# Patient Record
Sex: MALE | Race: WHITE | NOT HISPANIC OR LATINO | Employment: OTHER | URBAN - METROPOLITAN AREA
[De-identification: names, ages, dates, MRNs, and addresses within clinical notes are randomized per-mention and may not be internally consistent; named-entity substitution may affect disease eponyms.]

---

## 2018-04-23 ENCOUNTER — OFFICE VISIT (OUTPATIENT)
Dept: ORTHOPEDIC SURGERY | Facility: CLINIC | Age: 68
End: 2018-04-23

## 2018-04-23 VITALS
BODY MASS INDEX: 31.94 KG/M2 | DIASTOLIC BLOOD PRESSURE: 83 MMHG | SYSTOLIC BLOOD PRESSURE: 145 MMHG | HEIGHT: 71 IN | HEART RATE: 65 BPM | WEIGHT: 228.18 LBS

## 2018-04-23 DIAGNOSIS — M17.11 PRIMARY OSTEOARTHRITIS OF RIGHT KNEE: Primary | ICD-10-CM

## 2018-04-23 PROCEDURE — 99203 OFFICE O/P NEW LOW 30 MIN: CPT | Performed by: ORTHOPAEDIC SURGERY

## 2018-04-23 PROCEDURE — 20610 DRAIN/INJ JOINT/BURSA W/O US: CPT | Performed by: ORTHOPAEDIC SURGERY

## 2018-04-23 RX ORDER — LEVOTHYROXINE SODIUM 125 MCG
125 TABLET ORAL DAILY
COMMUNITY
Start: 2018-02-24

## 2018-04-23 RX ORDER — ROPIVACAINE HYDROCHLORIDE 5 MG/ML
4 INJECTION, SOLUTION EPIDURAL; INFILTRATION; PERINEURAL
Status: COMPLETED | OUTPATIENT
Start: 2018-04-23 | End: 2018-04-23

## 2018-04-23 RX ORDER — SIMVASTATIN 10 MG
10 TABLET ORAL NIGHTLY
COMMUNITY
Start: 2018-04-06

## 2018-04-23 RX ORDER — TRIAMCINOLONE ACETONIDE 40 MG/ML
40 INJECTION, SUSPENSION INTRA-ARTICULAR; INTRAMUSCULAR
Status: COMPLETED | OUTPATIENT
Start: 2018-04-23 | End: 2018-04-23

## 2018-04-23 RX ORDER — LISINOPRIL AND HYDROCHLOROTHIAZIDE 20; 12.5 MG/1; MG/1
1 TABLET ORAL DAILY
COMMUNITY
Start: 2018-04-06

## 2018-04-23 RX ORDER — MELOXICAM 15 MG/1
TABLET ORAL
COMMUNITY
Start: 2018-04-11 | End: 2019-07-10

## 2018-04-23 RX ADMIN — ROPIVACAINE HYDROCHLORIDE 4 ML: 5 INJECTION, SOLUTION EPIDURAL; INFILTRATION; PERINEURAL at 11:12

## 2018-04-23 RX ADMIN — TRIAMCINOLONE ACETONIDE 40 MG: 40 INJECTION, SUSPENSION INTRA-ARTICULAR; INTRAMUSCULAR at 11:12

## 2018-04-23 NOTE — PROGRESS NOTES
[facility] [place] [facility]opa[facility] Surgery Clinic Note    Subjective     Chief Complaint   Patient presents with   • Right Knee - Pain        HPI    Mir Stokes is a 67 y.o. male. He presents today for evaluation of right knee pain.  He has moderate to severe pain, ongoing for 6 months, following no injury.  Pain is sharp, worse with climbing stairs, and unchanged over that timeframe.    Of note, his left total knee arthroplasty is functioning well.      There is no problem list on file for this patient.    Past Medical History:   Diagnosis Date   • Hypertension       Past Surgical History:   Procedure Laterality Date   • CATARACT EXTRACTION  2017   • JOINT REPLACEMENT Left 2014    TKA       Family History   Problem Relation Age of Onset   • Hypertension Mother    • Heart attack Mother    • Hypertension Father    • Heart attack Father      Social History     Social History   • Marital status:      Spouse name: N/A   • Number of children: N/A   • Years of education: N/A     Occupational History   • Not on file.     Social History Main Topics   • Smoking status: Never Smoker   • Smokeless tobacco: Current User     Types: Chew   • Alcohol use Yes      Comment: occasional   • Drug use: No   • Sexual activity: Defer     Other Topics Concern   • Not on file     Social History Narrative   • No narrative on file      No current outpatient prescriptions on file prior to visit.     No current facility-administered medications on file prior to visit.       No Known Allergies     Review of Systems   Constitutional: Negative for activity change, appetite change, chills, diaphoresis, fatigue, fever and unexpected weight change.   HENT: Negative for congestion, dental problem, drooling, ear discharge, ear pain, facial swelling, hearing loss, mouth sores, nosebleeds, postnasal drip, rhinorrhea, sinus pressure, sneezing, sore throat, tinnitus, trouble swallowing and voice change.    Eyes: Positive for visual disturbance. Negative for  "photophobia, pain, discharge, redness and itching.   Respiratory: Negative for apnea, cough, choking, chest tightness, shortness of breath, wheezing and stridor.    Cardiovascular: Negative for chest pain, palpitations and leg swelling.   Gastrointestinal: Negative for abdominal distention, abdominal pain, anal bleeding, blood in stool, constipation, diarrhea, nausea, rectal pain and vomiting.   Endocrine: Positive for cold intolerance. Negative for heat intolerance, polydipsia, polyphagia and polyuria.   Genitourinary: Negative for decreased urine volume, difficulty urinating, dysuria, enuresis, flank pain, frequency, genital sores, hematuria and urgency.   Musculoskeletal: Positive for arthralgias and joint swelling. Negative for back pain, gait problem, myalgias, neck pain and neck stiffness.   Skin: Negative for color change, pallor, rash and wound.   Allergic/Immunologic: Negative for environmental allergies, food allergies and immunocompromised state.   Neurological: Negative for dizziness, tremors, seizures, syncope, facial asymmetry, speech difficulty, weakness, light-headedness, numbness and headaches.   Hematological: Negative for adenopathy. Does not bruise/bleed easily.   Psychiatric/Behavioral: Negative for agitation, behavioral problems, confusion, decreased concentration, dysphoric mood, hallucinations, self-injury, sleep disturbance and suicidal ideas. The patient is not nervous/anxious and is not hyperactive.         Objective      Physical Exam  /83   Pulse 65   Ht 180 cm (70.87\")   Wt 104 kg (228 lb 2.8 oz)   BMI 31.94 kg/m²     Body mass index is 31.94 kg/m².    General:   Mental Status:  Alert   Appearance: Cooperative, in no acute distress   Build and Nutrition: Well-nourished and well developed male   Orientation: Alert and oriented to person, place and time   Posture: Normal   Gait: Normal    Integument:   Right knee: No skin lesions, no rash, no " ecchymosis    Neurologic:   Sensation:    Right foot: Intact to light touch on the dorsal and plantar aspect   Motor:  Right lower extremity: 5/5 quadriceps, hamstrings, ankle dorsiflexors, and ankle plantar flexors    Vascular:   Right lower extremity: 2+ dorsalis pedis pulse, prompt capillary refill    Lower Extremities:   Right Knee:    Tenderness:  Medial joint line tenderness    Effusion:  None    Swelling:  None    Crepitus:  Positive    Atrophy:  None    Range of motion:  Extension: 0°       Flexion: 120°  Instability:  No varus laxity, no valgus laxity, negative anterior drawer  Deformities:  None      Imaging/Studies  Imaging Results (last 24 hours)     Procedure Component Value Units Date/Time    XR Knee 4+ View Right [[id number]] Resulted:  04/23/18 1058     Updated:  04/23/18 1059    Narrative:       Right Knee Radiographs  Indication: right knee pain  Views: Standing AP's and skiers of both knees, with lateral and sunrise   views of the right knee    Comparison: no prior studies available    Findings:   Medial and lateral joint space narrowing is appreciated, with   patellofemoral degeneration consistent with osteoarthritis.  No   bone-on-bone contact.            Assessment and Plan     Mir was seen today for pain.    Diagnoses and all orders for this visit:    Primary osteoarthritis of right knee  -     XR Knee 4+ View Right  -     Large Joint Arthrocentesis        I reviewed my findings with patient today.  He does have some findings consistent with arthritis in the right knee, we discussed treatment options.  He would like to try an intra-articular injection today, and this was provided.  He may be a good candidate for Visco supplementation injections in the future.    Of note, he had 100% relief just payments following the injection.    Return in about 2 months (around 6/23/2018).      Medical Decision Making  Management Options : prescription/IM medicine  Data/Risk: radiology tests and independent  visualization of imaging, lab tests, or EMG/NCV      José Luis Liao MD  04/23/18  11:19 AM

## 2018-04-23 NOTE — PROGRESS NOTES
Procedure   Large Joint Arthrocentesis  Date/Time: 4/23/2018 11:12 AM  Consent given by: patient  Site marked: site marked  Timeout: Immediately prior to procedure a time out was called to verify the correct patient, procedure, equipment, support staff and site/side marked as required   Supporting Documentation  Indications: pain   Procedure Details  Location: knee - R knee  Preparation: Patient was prepped and draped in the usual sterile fashion  Needle size: 22 G  Approach: anterolateral  Medications administered: 4 mL ropivacaine 0.5 %; 40 mg triamcinolone acetonide 40 MG/ML  Patient tolerance: patient tolerated the procedure well with no immediate complications

## 2018-06-20 ENCOUNTER — OFFICE VISIT (OUTPATIENT)
Dept: ORTHOPEDIC SURGERY | Facility: CLINIC | Age: 68
End: 2018-06-20

## 2018-06-20 VITALS — WEIGHT: 220.46 LBS | HEART RATE: 80 BPM | BODY MASS INDEX: 30.86 KG/M2 | OXYGEN SATURATION: 99 % | HEIGHT: 71 IN

## 2018-06-20 DIAGNOSIS — M17.11 PRIMARY OSTEOARTHRITIS OF RIGHT KNEE: Primary | ICD-10-CM

## 2018-06-20 PROCEDURE — 99212 OFFICE O/P EST SF 10 MIN: CPT | Performed by: ORTHOPAEDIC SURGERY

## 2018-06-20 NOTE — PROGRESS NOTES
[facility] – [place] Orthopaedic Surgery Clinic Note    Subjective     Chief Complaint   Patient presents with   • Right Knee - Follow-up     Primary Osteoarthritis of Right Knee; 2 month f/u; Cortisone Injection given 4/23/18        HPI    Mir Stokes is a 67 y.o. male. He follows up today for his right knee.  The knee is doing well today.  No complaints, and fully ambulatory.  Last injection helped, and the knee has been tolerable since.      There is no problem list on file for this patient.    Past Medical History:   Diagnosis Date   • Hypertension       Past Surgical History:   Procedure Laterality Date   • CATARACT EXTRACTION  2017   • JOINT REPLACEMENT Left 2014    TKA       Family History   Problem Relation Age of Onset   • Hypertension Mother    • Heart attack Mother    • Hypertension Father    • Heart attack Father      Social History     Social History   • Marital status:      Spouse name: N/A   • Number of children: N/A   • Years of education: N/A     Occupational History   • Not on file.     Social History Main Topics   • Smoking status: Never Smoker   • Smokeless tobacco: Current User     Types: Chew   • Alcohol use Yes      Comment: occasional   • Drug use: No   • Sexual activity: Defer     Other Topics Concern   • Not on file     Social History Narrative   • No narrative on file      Current Outpatient Prescriptions on File Prior to Visit   Medication Sig Dispense Refill   • lisinopril-hydrochlorothiazide (PRINZIDE,ZESTORETIC) 20-12.5 MG per tablet      • meloxicam (MOBIC) 15 MG tablet      • simvastatin (ZOCOR) 10 MG tablet      • SYNTHROID 125 MCG tablet        No current facility-administered medications on file prior to visit.       No Known Allergies     Review of Systems   Constitutional: Negative for activity change, appetite change, chills, diaphoresis, fatigue, fever and unexpected weight change.   HENT: Negative for congestion, dental problem, drooling, ear discharge, ear pain, facial  "swelling, hearing loss, mouth sores, nosebleeds, postnasal drip, rhinorrhea, sinus pressure, sneezing, sore throat, tinnitus, trouble swallowing and voice change.    Eyes: Positive for visual disturbance. Negative for photophobia, pain, discharge, redness and itching.   Respiratory: Negative for apnea, cough, choking, chest tightness, shortness of breath, wheezing and stridor.    Cardiovascular: Negative for chest pain, palpitations and leg swelling.   Gastrointestinal: Negative for abdominal distention, abdominal pain, anal bleeding, blood in stool, constipation, diarrhea, nausea, rectal pain and vomiting.   Endocrine: Negative for cold intolerance, heat intolerance, polydipsia, polyphagia and polyuria.   Genitourinary: Negative for decreased urine volume, difficulty urinating, dysuria, enuresis, flank pain, frequency, genital sores, hematuria and urgency.   Musculoskeletal: Positive for joint swelling. Negative for arthralgias, back pain, gait problem, myalgias, neck pain and neck stiffness.        Joint Pain    Skin: Negative for color change, pallor, rash and wound.   Allergic/Immunologic: Negative for environmental allergies, food allergies and immunocompromised state.   Neurological: Negative for dizziness, tremors, seizures, syncope, facial asymmetry, speech difficulty, weakness, light-headedness, numbness and headaches.   Hematological: Negative for adenopathy. Does not bruise/bleed easily.   Psychiatric/Behavioral: Negative for agitation, behavioral problems, confusion, decreased concentration, dysphoric mood, hallucinations, self-injury, sleep disturbance and suicidal ideas. The patient is not nervous/anxious and is not hyperactive.         Objective      Physical Exam  Pulse 80   Ht 180 cm (70.87\")   Wt 100 kg (220 lb 7.4 oz)   SpO2 99%   BMI 30.86 kg/m²     Body mass index is 30.86 kg/m².    General:   Mental Status:  Alert   Appearance: Cooperative, in no acute distress   Build and Nutrition: " Well-nourished and well developed male   Orientation: Alert and oriented to person, place and time   Posture: Normal   Gait: Normal    Integument:   Right knee: No skin lesions, no rash, no ecchymosis    Lower Extremities:   Right Knee:    Tenderness:  None    Effusion:  None    Swelling: None    Range of motion:  Extension: 0°       Flexion: 125°  Instability:  No varus laxity, no valgus laxity, negative anterior drawer  Deformities:  None          Assessment and Plan     Mir was seen today for follow-up.    Diagnoses and all orders for this visit:    Primary osteoarthritis of right knee        I reviewed my findings with patient today.  His right knee is doing well today, and I will see him back in 6 months.  If he has any worsening or problems to none then, he may come back in for an injection if appropriate.    Return in about 6 months (around 12/20/2018).          José Luis Liao MD  06/20/18  9:54 AM

## 2018-12-19 ENCOUNTER — OFFICE VISIT (OUTPATIENT)
Dept: ORTHOPEDIC SURGERY | Facility: CLINIC | Age: 68
End: 2018-12-19

## 2018-12-19 VITALS — WEIGHT: 235.89 LBS | BODY MASS INDEX: 33.02 KG/M2 | HEART RATE: 79 BPM | OXYGEN SATURATION: 98 % | HEIGHT: 71 IN

## 2018-12-19 DIAGNOSIS — M17.11 PRIMARY OSTEOARTHRITIS OF RIGHT KNEE: Primary | ICD-10-CM

## 2018-12-19 PROCEDURE — 20610 DRAIN/INJ JOINT/BURSA W/O US: CPT | Performed by: ORTHOPAEDIC SURGERY

## 2018-12-19 RX ORDER — ROPIVACAINE HYDROCHLORIDE 5 MG/ML
4 INJECTION, SOLUTION EPIDURAL; INFILTRATION; PERINEURAL
Status: COMPLETED | OUTPATIENT
Start: 2018-12-19 | End: 2018-12-19

## 2018-12-19 RX ORDER — TRIAMCINOLONE ACETONIDE 40 MG/ML
40 INJECTION, SUSPENSION INTRA-ARTICULAR; INTRAMUSCULAR
Status: COMPLETED | OUTPATIENT
Start: 2018-12-19 | End: 2018-12-19

## 2018-12-19 RX ADMIN — TRIAMCINOLONE ACETONIDE 40 MG: 40 INJECTION, SUSPENSION INTRA-ARTICULAR; INTRAMUSCULAR at 10:15

## 2018-12-19 RX ADMIN — ROPIVACAINE HYDROCHLORIDE 4 ML: 5 INJECTION, SOLUTION EPIDURAL; INFILTRATION; PERINEURAL at 10:15

## 2018-12-19 NOTE — PROGRESS NOTES
Procedure   Large Joint Arthrocentesis: R knee  Date/Time: 12/19/2018 10:15 AM  Consent given by: patient  Site marked: site marked  Timeout: Immediately prior to procedure a time out was called to verify the correct patient, procedure, equipment, support staff and site/side marked as required   Supporting Documentation  Indications: pain   Procedure Details  Location: knee - R knee  Preparation: Patient was prepped and draped in the usual sterile fashion  Needle size: 22 G  Approach: anterolateral  Medications administered: 4 mL ropivacaine 0.5 %; 40 mg triamcinolone acetonide 40 MG/ML  Patient tolerance: patient tolerated the procedure well with no immediate complications

## 2018-12-19 NOTE — PROGRESS NOTES
[facility] Orthopaedic Surgery Clinic Note    Subjective     Chief Complaint   Patient presents with   • Right Knee - Follow-up     Osteoarthritis-injection (4/23/18)        HPI    Mir Stokes is a 68 y.o. male.  He follows up today for his right knee.  He continues to have pain off and on in the knee, 5 out of 10, dull and stabbing as well as shooting.  The pain is been present for over a year, following no recent injury.      There is no problem list on file for this patient.    Past Medical History:   Diagnosis Date   • Hypertension       Past Surgical History:   Procedure Laterality Date   • CATARACT EXTRACTION  2017   • JOINT REPLACEMENT Left 2014    TKA       Family History   Problem Relation Age of Onset   • Hypertension Mother    • Heart attack Mother    • Hypertension Father    • Heart attack Father      Social History     Socioeconomic History   • Marital status:      Spouse name: Not on file   • Number of children: Not on file   • Years of education: Not on file   • Highest education level: Not on file   Social Needs   • Financial resource strain: Not on file   • Food insecurity - worry: Not on file   • Food insecurity - inability: Not on file   • Transportation needs - medical: Not on file   • Transportation needs - non-medical: Not on file   Occupational History   • Not on file   Tobacco Use   • Smoking status: Former Smoker   • Smokeless tobacco: Current User     Types: Chew   Substance and Sexual Activity   • Alcohol use: Yes     Comment: occasional   • Drug use: No   • Sexual activity: Defer   Other Topics Concern   • Not on file   Social History Narrative   • Not on file      Current Outpatient Medications on File Prior to Visit   Medication Sig Dispense Refill   • lisinopril-hydrochlorothiazide (PRINZIDE,ZESTORETIC) 20-12.5 MG per tablet      • meloxicam (MOBIC) 15 MG tablet      • simvastatin (ZOCOR) 10 MG tablet      • SYNTHROID 125 MCG tablet        No current facility-administered  "medications on file prior to visit.       No Known Allergies     Review of Systems   Constitutional: Negative.    HENT: Positive for hearing loss and sinus pressure.    Eyes: Negative.    Respiratory: Negative.    Cardiovascular: Negative.    Gastrointestinal: Negative.    Endocrine: Negative.    Genitourinary: Negative.    Musculoskeletal: Positive for arthralgias.   Skin: Negative.    Allergic/Immunologic: Negative.    Neurological: Negative.    Hematological: Negative.    Psychiatric/Behavioral: Negative.         Objective      Physical Exam  Pulse 79   Ht 180 cm (70.87\")   Wt 107 kg (235 lb 14.3 oz)   SpO2 98%   BMI 33.02 kg/m²     Body mass index is 33.02 kg/m².    General:   Mental Status:  Alert   Appearance: Cooperative, in no acute distress   Build and Nutrition: Well-nourished and well developed male   Orientation: Alert and oriented to person, place and time   Posture: Normal   Gait: Normal    Integument:   Right knee: No skin lesions, no rash, no ecchymosis    Lower Extremities:   Right Knee:    Tenderness:  Medial joint line tenderness    Effusion:  None    Swelling: None    Crepitus:  Positive    Range of motion:  Extension: 0°       Flexion: 120°  Instability:  No varus laxity, no valgus laxity, negative anterior drawer  Deformities:  None          Assessment and Plan     Mir was seen today for follow-up.    Diagnoses and all orders for this visit:    Primary osteoarthritis of right knee  -     Large Joint Arthrocentesis: R knee        I reviewed my findings with patient today.  Right knee continues to bother him off and on, and he would like to have an injection today.  This was provided, and I will see him back in 6 months, but sooner for any problems.    Of note, he had 100% relief just a few minutes following the injection.    Return in about 6 months (around 6/19/2019).      Medical Decision Making  Management Options : prescription/IM medicine      José Luis Liao MD  12/19/18  10:35 " AM

## 2019-03-20 ENCOUNTER — OFFICE VISIT (OUTPATIENT)
Dept: ORTHOPEDIC SURGERY | Facility: CLINIC | Age: 69
End: 2019-03-20

## 2019-03-20 VITALS — HEART RATE: 85 BPM | OXYGEN SATURATION: 98 % | BODY MASS INDEX: 32.28 KG/M2 | HEIGHT: 71 IN | WEIGHT: 230.6 LBS

## 2019-03-20 DIAGNOSIS — M17.11 PRIMARY OSTEOARTHRITIS OF RIGHT KNEE: Primary | ICD-10-CM

## 2019-03-20 PROCEDURE — 99213 OFFICE O/P EST LOW 20 MIN: CPT | Performed by: ORTHOPAEDIC SURGERY

## 2019-03-20 PROCEDURE — 20610 DRAIN/INJ JOINT/BURSA W/O US: CPT | Performed by: ORTHOPAEDIC SURGERY

## 2019-03-20 RX ORDER — ROPIVACAINE HYDROCHLORIDE 5 MG/ML
4 INJECTION, SOLUTION EPIDURAL; INFILTRATION; PERINEURAL
Status: COMPLETED | OUTPATIENT
Start: 2019-03-20 | End: 2019-03-20

## 2019-03-20 RX ORDER — TRIAMCINOLONE ACETONIDE 40 MG/ML
40 INJECTION, SUSPENSION INTRA-ARTICULAR; INTRAMUSCULAR
Status: COMPLETED | OUTPATIENT
Start: 2019-03-20 | End: 2019-03-20

## 2019-03-20 RX ADMIN — TRIAMCINOLONE ACETONIDE 40 MG: 40 INJECTION, SUSPENSION INTRA-ARTICULAR; INTRAMUSCULAR at 16:00

## 2019-03-20 RX ADMIN — ROPIVACAINE HYDROCHLORIDE 4 ML: 5 INJECTION, SOLUTION EPIDURAL; INFILTRATION; PERINEURAL at 16:00

## 2019-03-20 NOTE — PROGRESS NOTES
Procedure   Large Joint Arthrocentesis: R knee  Date/Time: 3/20/2019 4:00 PM  Consent given by: patient  Site marked: site marked  Timeout: Immediately prior to procedure a time out was called to verify the correct patient, procedure, equipment, support staff and site/side marked as required   Supporting Documentation  Indications: pain   Procedure Details  Location: knee - R knee  Preparation: Patient was prepped and draped in the usual sterile fashion  Needle size: 22 G  Approach: anterolateral  Medications administered: 40 mg triamcinolone acetonide 40 MG/ML; 4 mL ropivacaine 0.5 %  Aspirate amount: 10 mL  Aspirate: clear and yellow  Patient tolerance: patient tolerated the procedure well with no immediate complications

## 2019-03-20 NOTE — PROGRESS NOTES
[facility] – [place] Orthopaedic Surgery Clinic Note    Subjective     Chief Complaint   Patient presents with   • Right Knee - Follow-up     3 months        HPI    Mir Stokes is a 68 y.o. male.  He presents today for evaluation of right knee pain.  The pain has been present for several years, but he had worsening about 2-3 weeks ago, with swelling and increase in pain.  However, it did improved since she made the appointment, but he still having pain in the knee, which is aching in quality.  He does have swelling.  Known history of arthritis in the knee.      There is no problem list on file for this patient.    Past Medical History:   Diagnosis Date   • Hypertension       Past Surgical History:   Procedure Laterality Date   • CATARACT EXTRACTION  2017   • JOINT REPLACEMENT Left 2014    TKA       Family History   Problem Relation Age of Onset   • Hypertension Mother    • Heart attack Mother    • Hypertension Father    • Heart attack Father      Social History     Socioeconomic History   • Marital status:      Spouse name: Not on file   • Number of children: Not on file   • Years of education: Not on file   • Highest education level: Not on file   Tobacco Use   • Smoking status: Former Smoker   • Smokeless tobacco: Current User     Types: Chew   Substance and Sexual Activity   • Alcohol use: Yes     Comment: occasional   • Drug use: No   • Sexual activity: Defer      Current Outpatient Medications on File Prior to Visit   Medication Sig Dispense Refill   • lisinopril-hydrochlorothiazide (PRINZIDE,ZESTORETIC) 20-12.5 MG per tablet      • meloxicam (MOBIC) 15 MG tablet      • simvastatin (ZOCOR) 10 MG tablet      • SYNTHROID 125 MCG tablet        No current facility-administered medications on file prior to visit.       No Known Allergies     Review of Systems   Constitutional: Negative for activity change, appetite change, chills, diaphoresis, fatigue, fever and unexpected weight change.   HENT: Negative for  "congestion, dental problem, drooling, ear discharge, ear pain, facial swelling, hearing loss, mouth sores, nosebleeds, postnasal drip, rhinorrhea, sinus pressure, sneezing, sore throat, tinnitus, trouble swallowing and voice change.    Eyes: Negative for photophobia, pain, discharge, redness, itching and visual disturbance.   Respiratory: Negative for apnea, cough, choking, chest tightness, shortness of breath, wheezing and stridor.    Cardiovascular: Negative for chest pain, palpitations and leg swelling.   Gastrointestinal: Negative for abdominal distention, abdominal pain, anal bleeding, blood in stool, constipation, diarrhea, nausea, rectal pain and vomiting.   Endocrine: Negative for cold intolerance, heat intolerance, polydipsia, polyphagia and polyuria.   Genitourinary: Negative for decreased urine volume, difficulty urinating, dysuria, enuresis, flank pain, frequency, genital sores, hematuria and urgency.   Musculoskeletal: Positive for arthralgias. Negative for back pain, gait problem, joint swelling, myalgias, neck pain and neck stiffness.   Skin: Negative for color change, pallor, rash and wound.   Allergic/Immunologic: Negative for environmental allergies, food allergies and immunocompromised state.   Neurological: Negative for dizziness, tremors, seizures, syncope, facial asymmetry, speech difficulty, weakness, light-headedness, numbness and headaches.   Hematological: Negative for adenopathy. Does not bruise/bleed easily.   Psychiatric/Behavioral: Negative for agitation, behavioral problems, confusion, decreased concentration, dysphoric mood, hallucinations, self-injury, sleep disturbance and suicidal ideas. The patient is not nervous/anxious and is not hyperactive.         Objective      Physical Exam  Pulse 85   Ht 180 cm (70.87\")   Wt 105 kg (230 lb 9.6 oz)   SpO2 98%   BMI 32.28 kg/m²     Body mass index is 32.28 kg/m².    General:   Mental Status:  Alert   Appearance: Cooperative, in no acute " distress   Build and Nutrition: Well-nourished well-developed male   Orientation: Alert and oriented to person, place and time   Posture: Normal   Gait: Normal    Integument:   Right knee: No skin lesions, no rash, no ecchymosis    Lower Extremities:   Right Knee:    Tenderness:  None    Effusion:  1+    Swelling: None    Crepitus:  Positive    Range of motion:  Extension: 0°       Flexion: 125°  Instability:  No varus laxity, no valgus laxity, negative anterior drawer  Deformities:  None        Imaging/Studies      Imaging Results (last 24 hours)     Procedure Component Value Units Date/Time    XR Knee 4+ View Right [[id number]] Resulted:  03/20/19 1552     Updated:  03/20/19 1553    Narrative:       Right Knee Radiographs  Indication: right knee pain  Views: Standing AP's and skiers of both knees, with lateral and sunrise   views of the right knee    Comparison: 4/23/2018    Findings:   Mild worsening of arthritis, with medial joint space narrowing, near   bone-on-bone contact medial compartment, with patellofemoral spurring   consistent with degeneration of the knee.          Assessment and Plan     Mir was seen today for follow-up.    Diagnoses and all orders for this visit:    Primary osteoarthritis of right knee  -     XR Knee 4+ View Right  -     Large Joint Arthrocentesis: R knee        1. Primary osteoarthritis of right knee        I reviewed my findings with the patient today.  He does have right knee arthritis, and probably had a flare a few weeks ago.  He still has a reviewed residual effusion, and I offered him an aspiration and injection today.  I obtained 10 cc of clear straw-colored fluid, and then injected his knee with 50% relief just a few minutes following the injection.    Return in about 4 months (around 7/20/2019).      Medical Decision Making  Management Options : prescription/IM medicine  Data/Risk: radiology tests and independent visualization of imaging, lab tests, or EMG/NCV      José Luis  JOSE E Liao MD  03/20/19  4:34 PM

## 2019-04-12 ENCOUNTER — TELEPHONE (OUTPATIENT)
Dept: ORTHOPEDIC SURGERY | Facility: CLINIC | Age: 69
End: 2019-04-12

## 2019-04-12 DIAGNOSIS — M17.11 PRIMARY OSTEOARTHRITIS OF RIGHT KNEE: Primary | ICD-10-CM

## 2019-04-24 ENCOUNTER — CLINICAL SUPPORT (OUTPATIENT)
Dept: ORTHOPEDIC SURGERY | Facility: CLINIC | Age: 69
End: 2019-04-24

## 2019-04-24 DIAGNOSIS — M17.11 PRIMARY OSTEOARTHRITIS OF RIGHT KNEE: Primary | ICD-10-CM

## 2019-04-24 PROCEDURE — 20610 DRAIN/INJ JOINT/BURSA W/O US: CPT | Performed by: ORTHOPAEDIC SURGERY

## 2019-04-24 NOTE — PROGRESS NOTES
[facility] [facility] Note    Subjective     Chief Complaint   Patient presents with   • Follow-up     right knee orthovisc injection #1        HPI    Mir Stokes III is a 68 y.o. male who presents for the first Orthovisc injection to the right knee.    Patient Active Problem List   Diagnosis   • Primary osteoarthritis of right knee     Past Medical History:   Diagnosis Date   • Hypertension       Past Surgical History:   Procedure Laterality Date   • CATARACT EXTRACTION  2017   • JOINT REPLACEMENT Left 2014    TKA       Family History   Problem Relation Age of Onset   • Hypertension Mother    • Heart attack Mother    • Hypertension Father    • Heart attack Father      Social History     Socioeconomic History   • Marital status:      Spouse name: Not on file   • Number of children: Not on file   • Years of education: Not on file   • Highest education level: Not on file   Tobacco Use   • Smoking status: Former Smoker   • Smokeless tobacco: Current User     Types: Chew   Substance and Sexual Activity   • Alcohol use: Yes     Comment: occasional   • Drug use: No   • Sexual activity: Defer      Current Outpatient Medications on File Prior to Visit   Medication Sig Dispense Refill   • lisinopril-hydrochlorothiazide (PRINZIDE,ZESTORETIC) 20-12.5 MG per tablet      • meloxicam (MOBIC) 15 MG tablet      • simvastatin (ZOCOR) 10 MG tablet      • SYNTHROID 125 MCG tablet        No current facility-administered medications on file prior to visit.       No Known Allergies     Review of Systems     Objective      Physical Exam  There were no vitals taken for this visit.    There is no height or weight on file to calculate BMI.    General:   Mental Status:  Alert   Appearance: Cooperative, in no acute distress   Posture: Normal    Assessment and Plan     Mir was seen today for follow-up.    Diagnoses and all orders for this visit:    Primary osteoarthritis of right knee  -     Large Joint  Arthrocentesis: R knee        1. Primary osteoarthritis of right knee        Administration of viscosupplementation today.  Please see my procedure note for details.    Return in about 1 week (around 5/1/2019) for Injection.    José Luis Liao MD  04/24/19  3:35 PM

## 2019-04-24 NOTE — PROGRESS NOTES
Procedure   Large Joint Arthrocentesis: R knee  Date/Time: 4/24/2019 3:07 PM  Consent given by: patient  Site marked: site marked  Timeout: Immediately prior to procedure a time out was called to verify the correct patient, procedure, equipment, support staff and site/side marked as required   Supporting Documentation  Indications: pain   Procedure Details  Location: knee - R knee  Preparation: Patient was prepped and draped in the usual sterile fashion  Needle size: 22 G  Approach: anterolateral  Medications administered: 30 mg Hyaluronan 30 MG/2ML  Patient tolerance: patient tolerated the procedure well with no immediate complications

## 2019-05-01 ENCOUNTER — CLINICAL SUPPORT (OUTPATIENT)
Dept: ORTHOPEDIC SURGERY | Facility: CLINIC | Age: 69
End: 2019-05-01

## 2019-05-01 DIAGNOSIS — M17.11 PRIMARY OSTEOARTHRITIS OF RIGHT KNEE: Primary | ICD-10-CM

## 2019-05-01 PROCEDURE — 20610 DRAIN/INJ JOINT/BURSA W/O US: CPT | Performed by: ORTHOPAEDIC SURGERY

## 2019-05-01 NOTE — PROGRESS NOTES
[facility] [place] [facility] Note    Subjective     Chief Complaint   Patient presents with   • Injections     Right knee orthovisc injection #2        HPI    Mir Stokes III is a 68 y.o. male who presents for the second Orthovisc injection in the right knee.  Of note, he has seen some relief so far.    Patient Active Problem List   Diagnosis   • Primary osteoarthritis of right knee     Past Medical History:   Diagnosis Date   • Hypertension       Past Surgical History:   Procedure Laterality Date   • CATARACT EXTRACTION  2017   • JOINT REPLACEMENT Left 2014    TKA       Family History   Problem Relation Age of Onset   • Hypertension Mother    • Heart attack Mother    • Hypertension Father    • Heart attack Father      Social History     Socioeconomic History   • Marital status:      Spouse name: Not on file   • Number of children: Not on file   • Years of education: Not on file   • Highest education level: Not on file   Tobacco Use   • Smoking status: Former Smoker   • Smokeless tobacco: Current User     Types: Chew   Substance and Sexual Activity   • Alcohol use: Yes     Comment: occasional   • Drug use: No   • Sexual activity: Defer      Current Outpatient Medications on File Prior to Visit   Medication Sig Dispense Refill   • lisinopril-hydrochlorothiazide (PRINZIDE,ZESTORETIC) 20-12.5 MG per tablet      • meloxicam (MOBIC) 15 MG tablet      • simvastatin (ZOCOR) 10 MG tablet      • SYNTHROID 125 MCG tablet        No current facility-administered medications on file prior to visit.       No Known Allergies     Review of Systems     Objective      Physical Exam  There were no vitals taken for this visit.    There is no height or weight on file to calculate BMI.    General:   Mental Status:  Alert   Appearance: Cooperative, in no acute distress   Posture: Normal    Assessment and Plan     Mir was seen today for injections.    Diagnoses and all orders for this visit:    Primary osteoarthritis of  right knee  -     Large Joint Arthrocentesis: R knee        1. Primary osteoarthritis of right knee        Administration of viscosupplementation today.  Please see my procedure note for details.    Return in about 1 week (around 5/8/2019) for Injection.    José Luis Liao MD  05/01/19  2:06 PM

## 2019-05-01 NOTE — PROGRESS NOTES
Procedure   Large Joint Arthrocentesis: R knee  Date/Time: 5/1/2019 1:57 PM  Consent given by: patient  Site marked: site marked  Timeout: Immediately prior to procedure a time out was called to verify the correct patient, procedure, equipment, support staff and site/side marked as required   Supporting Documentation  Indications: pain   Procedure Details  Location: knee - R knee  Preparation: Patient was prepped and draped in the usual sterile fashion  Needle size: 22 G  Approach: anterolateral  Medications administered: 30 mg Hyaluronan 30 MG/2ML  Patient tolerance: patient tolerated the procedure well with no immediate complications

## 2019-05-08 ENCOUNTER — CLINICAL SUPPORT (OUTPATIENT)
Dept: ORTHOPEDIC SURGERY | Facility: CLINIC | Age: 69
End: 2019-05-08

## 2019-05-08 DIAGNOSIS — M17.11 PRIMARY OSTEOARTHRITIS OF RIGHT KNEE: Primary | ICD-10-CM

## 2019-05-08 PROCEDURE — 20610 DRAIN/INJ JOINT/BURSA W/O US: CPT | Performed by: ORTHOPAEDIC SURGERY

## 2019-05-08 NOTE — PROGRESS NOTES
Procedure   Large Joint Arthrocentesis: R knee  Date/Time: 5/8/2019 2:10 PM  Consent given by: patient  Site marked: site marked  Timeout: Immediately prior to procedure a time out was called to verify the correct patient, procedure, equipment, support staff and site/side marked as required   Supporting Documentation  Indications: pain   Procedure Details  Location: knee - R knee  Preparation: Patient was prepped and draped in the usual sterile fashion  Needle size: 22 G  Approach: anterolateral  Medications administered: 30 mg Hyaluronan 30 MG/2ML  Patient tolerance: patient tolerated the procedure well with no immediate complications

## 2019-07-10 ENCOUNTER — OFFICE VISIT (OUTPATIENT)
Dept: ORTHOPEDIC SURGERY | Facility: CLINIC | Age: 69
End: 2019-07-10

## 2019-07-10 VITALS — BODY MASS INDEX: 31.7 KG/M2 | HEART RATE: 84 BPM | HEIGHT: 71 IN | OXYGEN SATURATION: 98 % | WEIGHT: 226.41 LBS

## 2019-07-10 DIAGNOSIS — M17.11 PRIMARY OSTEOARTHRITIS OF RIGHT KNEE: Primary | ICD-10-CM

## 2019-07-10 PROCEDURE — 99213 OFFICE O/P EST LOW 20 MIN: CPT | Performed by: ORTHOPAEDIC SURGERY

## 2019-07-10 RX ORDER — ACETAMINOPHEN 325 MG/1
1000 TABLET ORAL ONCE
Status: CANCELLED | OUTPATIENT
Start: 2019-07-10 | End: 2019-07-10

## 2019-07-10 RX ORDER — MELOXICAM 7.5 MG/1
15 TABLET ORAL ONCE
Status: CANCELLED | OUTPATIENT
Start: 2019-07-10 | End: 2019-07-10

## 2019-07-10 RX ORDER — PREGABALIN 150 MG/1
150 CAPSULE ORAL ONCE
Status: CANCELLED | OUTPATIENT
Start: 2019-07-10 | End: 2019-07-10

## 2019-07-10 NOTE — PROGRESS NOTES
[facility] [place] [facility] [place]    Subjective     Chief Complaint   Patient presents with   • Right Knee - Follow-up     2 month f/u Primary osteoarthritis of right knee   F/u post Orthovisc injections last injection 05/08/19        HPI    Mir Stokes III is a 68 y.o. male.  He follows up today for his right knee.  He had no significant relief with the Visco supplementation injection series.  He is complaining of 8-10 out of 10 pain, worse with walking, standing, sitting and climbing stairs.  He also complains of pain at night, as well as with work and leisure activities.  No groin pain.  The pain is associated with swelling, popping, stiffness and giving way.  The pain is dull, aching and shooting.  He has reached the point where he would like to proceed with knee replacement surgery.  Of note, he had a successful outcome with a left total knee replacement in the past.  No history of clots nor clotting disorders.  Last injection was 5/8/2019.      Patient Active Problem List   Diagnosis   • Primary osteoarthritis of right knee     Past Medical History:   Diagnosis Date   • Hypertension       Past Surgical History:   Procedure Laterality Date   • CATARACT EXTRACTION  2017   • JOINT REPLACEMENT Left 2014    TKA       Family History   Problem Relation Age of Onset   • Hypertension Mother    • Heart attack Mother    • Hypertension Father    • Heart attack Father      Social History     Socioeconomic History   • Marital status:      Spouse name: Not on file   • Number of children: Not on file   • Years of education: Not on file   • Highest education level: Not on file   Tobacco Use   • Smoking status: Former Smoker   • Smokeless tobacco: Current User     Types: Chew   Substance and Sexual Activity   • Alcohol use: Yes     Comment: occasional   • Drug use: No   • Sexual activity: Defer      Current Outpatient Medications on File Prior to Visit   Medication Sig Dispense Refill   •  lisinopril-hydrochlorothiazide (PRINZIDE,ZESTORETIC) 20-12.5 MG per tablet      • simvastatin (ZOCOR) 10 MG tablet      • SYNTHROID 125 MCG tablet      • [DISCONTINUED] meloxicam (MOBIC) 15 MG tablet        No current facility-administered medications on file prior to visit.       No Known Allergies     Review of Systems   Constitutional: Negative.  Negative for activity change, appetite change, chills, diaphoresis, fatigue, fever and unexpected weight change.   HENT: Negative.  Negative for congestion, dental problem, drooling, ear discharge, ear pain, facial swelling, hearing loss, mouth sores, nosebleeds, postnasal drip, rhinorrhea, sinus pressure, sneezing, sore throat, tinnitus, trouble swallowing and voice change.    Eyes: Negative.  Negative for photophobia, pain, discharge, redness, itching and visual disturbance.   Respiratory: Negative.  Negative for apnea, cough, choking, chest tightness, shortness of breath, wheezing and stridor.    Cardiovascular: Negative.  Negative for chest pain, palpitations and leg swelling.   Gastrointestinal: Negative.  Negative for abdominal distention, abdominal pain, anal bleeding, blood in stool, constipation, diarrhea, nausea, rectal pain and vomiting.   Endocrine: Negative.  Negative for cold intolerance, heat intolerance, polydipsia, polyphagia and polyuria.   Genitourinary: Negative.  Negative for decreased urine volume, difficulty urinating, dysuria, enuresis, flank pain, frequency, genital sores, hematuria and urgency.   Musculoskeletal: Positive for arthralgias. Negative for back pain, gait problem, joint swelling, myalgias, neck pain and neck stiffness.   Skin: Negative.  Negative for color change, pallor, rash and wound.   Allergic/Immunologic: Negative.  Negative for environmental allergies, food allergies and immunocompromised state.   Neurological: Negative.  Negative for dizziness, tremors, seizures, syncope, facial asymmetry, speech difficulty, weakness,  "light-headedness, numbness and headaches.   Hematological: Negative.  Negative for adenopathy. Does not bruise/bleed easily.   Psychiatric/Behavioral: Negative.  Negative for agitation, behavioral problems, confusion, decreased concentration, dysphoric mood, hallucinations, self-injury, sleep disturbance and suicidal ideas. The patient is not nervous/anxious and is not hyperactive.         Objective      Physical Exam  Pulse 84   Ht 180 cm (70.87\")   Wt 103 kg (226 lb 6.6 oz)   SpO2 98%   BMI 31.70 kg/m²     Body mass index is 31.7 kg/m².    General:   Mental Status:  Alert   Appearance: Cooperative, in no acute distress   Build and Nutrition: Well-nourished well-developed male   Orientation: Alert and oriented to person, place and time   Posture: Normal   Gait: Limping on the right    Integument:   Right knee: No skin lesions, no rash, no ecchymosis    Lower Extremities:   Right Knee:    Tenderness:  Medial joint line tenderness    Effusion:  None    Swelling: None    Crepitus:  Positive    Range of motion:  Extension: 0°       Flexion: 120°  Instability:  No varus laxity, no valgus laxity, negative anterior drawer  Deformities:  None      Assessment and Plan     Mir was seen today for follow-up.    Diagnoses and all orders for this visit:    Primary osteoarthritis of right knee  -     Case Request; Standing  -     Instructions on coughing, deep breathing, and incentive spirometry.; Future  -     CBC and Differential; Future  -     Basic metabolic panel; Future  -     Protime-INR; Future  -     APTT; Future  -     Hemoglobin A1c; Future  -     Sedimentation rate; Future  -     C-reactive protein; Future  -     Urinalysis With Culture If Indicated -; Future  -     Tranexamic Acid 1,000 mg in sodium chloride 0.9 % 100 mL  -     Tranexamic Acid 1,000 mg in sodium chloride 0.9 % 100 mL  -     ceFAZolin (ANCEF) 2 g in sodium chloride 0.9 % 100 mL IVPB  -     acetaminophen (TYLENOL) tablet 975 mg  -     meloxicam " (MOBIC) tablet 15 mg  -     pregabalin (LYRICA) capsule 150 mg  -     mupirocin (BACTROBAN) 2 % nasal ointment 1 application  -     Case Request    Other orders  -     Outpatient In A Bed; Standing  -     Follow Anesthesia Guidelines / Standing Orders; Future  -     Obtain informed consent  -     Provide instructions to patient regarding NPO status  -     Clorhexidine skin prep  -     Follow Anesthesia Guidelines / Standing Orders; Standing  -     Nerve Block; Standing  -     Verify NPO Status; Standing  -     SCD (sequential compression device)- to be placed on patient in Pre-op; Standing  -     POC Glucose Once; Standing  -     Clip operative site; Standing  -     Obtain informed consent (if not collected inpatient or PAT); Standing  -     Notify Physician - Standard; Standing  -     NPO After Midnight  -     mupirocin (BACTROBAN NASAL) 2 % nasal ointment; into the nostril(s) as directed by provider 2 (Two) Times a Day.  -     chlorhexidine (HIBICLENS) 4 % external liquid; Apply  topically to the appropriate area as directed Daily. Shower with hibiclens solution as directed for 5 days prior to surgery        1. Primary osteoarthritis of right knee        I reviewed my findings with the patient today.  He continues to experience right knee pain in spite of conservative treatment.  He would like to proceed with knee replacement surgery.  We discussed the risk, benefits, and alternatives.  Please see my counseling note for details.  We will schedule surgery at a mutually convenient time.    Surgical Counseling     I have informed the patient of the diagnosis and the prognosis.  Exhaustive conservative treatment modalities have not resulted in long term pain relief.  The symptoms have progressed to the point of daily pain and inability to perform activities of daily living without significant pain. The patient has reached the point of desiring to proceed with total knee arthroplasty after discussing the risks,  benefits and alternatives to the procedure.  The surgical procedure itself was discussed in detail. Risks of the procedure were discussed, which included but are not limited to, bleeding, infection, damage to blood vessels and nerves, incomplete pain relief, loosening of the prosthesis, deep infection, need for further surgery, loss of limb, deep venous thrombosis, pulmonary embolus, death, heart attack, stroke, kidney failure, liver failure, and anesthetic complications.  In addition, the potential for deep infection developing in the future was discussed, which could require further surgery.  The knee would have to be re-opened, debrided, and potentially remove the prosthesis, which may or may not be replaced in the future.  Also, the possibility for loosening of the prosthesis has been mentioned.  If the prosthesis loosened, a revision arthroplasty could be performed, with results that are not as predictable compared to the original procedure.  The typical rehabilitative course has also been discussed, and full recovery may take up to a year to see the maximum benefit.  The importance of patient cooperation in the rehabilitative efforts has also been discussed.  No guarantees whatsoever were given.  The patient understands the potential risks versus the benefits and desires to proceed with total knee arthroplasty at a mutually convenient time.    Return for For surgery as planned.      Medical Decision Making  Management Options : major surgery with risk factors      José Luis Liao MD  07/10/19  5:40 PM

## 2019-09-05 ENCOUNTER — APPOINTMENT (OUTPATIENT)
Dept: PREADMISSION TESTING | Facility: HOSPITAL | Age: 69
End: 2019-09-05

## 2019-09-05 VITALS — BODY MASS INDEX: 31.76 KG/M2 | WEIGHT: 226.85 LBS | HEIGHT: 71 IN

## 2019-09-05 DIAGNOSIS — M17.11 PRIMARY OSTEOARTHRITIS OF RIGHT KNEE: ICD-10-CM

## 2019-09-05 LAB
ANION GAP SERPL CALCULATED.3IONS-SCNC: 13 MMOL/L (ref 5–15)
APTT PPP: 32.3 SECONDS (ref 24–37)
BASOPHILS # BLD AUTO: 0.02 10*3/MM3 (ref 0–0.2)
BASOPHILS NFR BLD AUTO: 0.3 % (ref 0–1.5)
BILIRUB UR QL STRIP: NEGATIVE
BUN BLD-MCNC: 27 MG/DL (ref 8–23)
BUN/CREAT SERPL: 21.3 (ref 7–25)
CALCIUM SPEC-SCNC: 9.6 MG/DL (ref 8.6–10.5)
CHLORIDE SERPL-SCNC: 101 MMOL/L (ref 98–107)
CLARITY UR: CLEAR
CO2 SERPL-SCNC: 27 MMOL/L (ref 22–29)
COLOR UR: YELLOW
CREAT BLD-MCNC: 1.27 MG/DL (ref 0.76–1.27)
CRP SERPL-MCNC: 0.39 MG/DL (ref 0–0.5)
DEPRECATED RDW RBC AUTO: 47.3 FL (ref 37–54)
EOSINOPHIL # BLD AUTO: 0.2 10*3/MM3 (ref 0–0.4)
EOSINOPHIL NFR BLD AUTO: 3.1 % (ref 0.3–6.2)
ERYTHROCYTE [DISTWIDTH] IN BLOOD BY AUTOMATED COUNT: 13 % (ref 12.3–15.4)
ERYTHROCYTE [SEDIMENTATION RATE] IN BLOOD: 12 MM/HR (ref 0–20)
GFR SERPL CREATININE-BSD FRML MDRD: 56 ML/MIN/1.73
GLUCOSE BLD-MCNC: 78 MG/DL (ref 65–99)
GLUCOSE UR STRIP-MCNC: NEGATIVE MG/DL
HBA1C MFR BLD: 5.1 % (ref 4.8–5.6)
HCT VFR BLD AUTO: 43.8 % (ref 37.5–51)
HGB BLD-MCNC: 14.8 G/DL (ref 13–17.7)
HGB UR QL STRIP.AUTO: NEGATIVE
IMM GRANULOCYTES # BLD AUTO: 0.02 10*3/MM3 (ref 0–0.05)
IMM GRANULOCYTES NFR BLD AUTO: 0.3 % (ref 0–0.5)
INR PPP: 1.11 (ref 0.85–1.16)
KETONES UR QL STRIP: NEGATIVE
LEUKOCYTE ESTERASE UR QL STRIP.AUTO: NEGATIVE
LYMPHOCYTES # BLD AUTO: 1.51 10*3/MM3 (ref 0.7–3.1)
LYMPHOCYTES NFR BLD AUTO: 23.5 % (ref 19.6–45.3)
MCH RBC QN AUTO: 33.6 PG (ref 26.6–33)
MCHC RBC AUTO-ENTMCNC: 33.8 G/DL (ref 31.5–35.7)
MCV RBC AUTO: 99.3 FL (ref 79–97)
MONOCYTES # BLD AUTO: 0.39 10*3/MM3 (ref 0.1–0.9)
MONOCYTES NFR BLD AUTO: 6.1 % (ref 5–12)
NEUTROPHILS # BLD AUTO: 4.29 10*3/MM3 (ref 1.7–7)
NEUTROPHILS NFR BLD AUTO: 66.7 % (ref 42.7–76)
NITRITE UR QL STRIP: NEGATIVE
NRBC BLD AUTO-RTO: 0 /100 WBC (ref 0–0.2)
PH UR STRIP.AUTO: <=5 [PH] (ref 5–8)
PLATELET # BLD AUTO: 135 10*3/MM3 (ref 140–450)
PMV BLD AUTO: 12.9 FL (ref 6–12)
POTASSIUM BLD-SCNC: 4.4 MMOL/L (ref 3.5–5.2)
PROT UR QL STRIP: NEGATIVE
PROTHROMBIN TIME: 13.8 SECONDS (ref 11.2–14.3)
RBC # BLD AUTO: 4.41 10*6/MM3 (ref 4.14–5.8)
SODIUM BLD-SCNC: 141 MMOL/L (ref 136–145)
SP GR UR STRIP: 1.02 (ref 1–1.03)
UROBILINOGEN UR QL STRIP: NORMAL
WBC NRBC COR # BLD: 6.43 10*3/MM3 (ref 3.4–10.8)

## 2019-09-05 PROCEDURE — 85730 THROMBOPLASTIN TIME PARTIAL: CPT | Performed by: ORTHOPAEDIC SURGERY

## 2019-09-05 PROCEDURE — 86140 C-REACTIVE PROTEIN: CPT | Performed by: ORTHOPAEDIC SURGERY

## 2019-09-05 PROCEDURE — 36415 COLL VENOUS BLD VENIPUNCTURE: CPT

## 2019-09-05 PROCEDURE — 85652 RBC SED RATE AUTOMATED: CPT | Performed by: ORTHOPAEDIC SURGERY

## 2019-09-05 PROCEDURE — 81003 URINALYSIS AUTO W/O SCOPE: CPT | Performed by: ORTHOPAEDIC SURGERY

## 2019-09-05 PROCEDURE — 83036 HEMOGLOBIN GLYCOSYLATED A1C: CPT | Performed by: ORTHOPAEDIC SURGERY

## 2019-09-05 PROCEDURE — 85025 COMPLETE CBC W/AUTO DIFF WBC: CPT | Performed by: ORTHOPAEDIC SURGERY

## 2019-09-05 PROCEDURE — 80048 BASIC METABOLIC PNL TOTAL CA: CPT | Performed by: ORTHOPAEDIC SURGERY

## 2019-09-05 PROCEDURE — 85610 PROTHROMBIN TIME: CPT | Performed by: ORTHOPAEDIC SURGERY

## 2019-09-05 PROCEDURE — 93010 ELECTROCARDIOGRAM REPORT: CPT | Performed by: INTERNAL MEDICINE

## 2019-09-05 PROCEDURE — 93005 ELECTROCARDIOGRAM TRACING: CPT

## 2019-09-05 RX ORDER — FERROUS SULFATE TAB EC 324 MG (65 MG FE EQUIVALENT) 324 (65 FE) MG
324 TABLET DELAYED RESPONSE ORAL
COMMUNITY

## 2019-09-05 RX ORDER — MELOXICAM 15 MG/1
15 TABLET ORAL DAILY
Status: ON HOLD | COMMUNITY
End: 2019-09-20 | Stop reason: SDUPTHER

## 2019-09-05 ASSESSMENT — KOOS JR
KOOS JR SCORE: 34.174
KOOS JR SCORE: 21

## 2019-09-05 NOTE — DISCHARGE INSTRUCTIONS
The following information and instructions were given:    Bactroban and Chlorhexidine Prescription given during PAT visit, as well as written and verbal instructions given to patient during PAT visit.  Patient/family also instructed to complete skin prep checklist and return the checklist on the day of surgery to preoperative staff.  Patient/family verbalized understanding.    Patient attended joint replacement class today during PAT visit.    Do not eat, drink, smoke or chew gum after midnight the night before surgery. This also includes no mints.  Take all routine, prescribed medications including heart and blood pressure medicines with a sip of water unless otherwise instructed by your physician.   Do NOT take diabetic medication unless instructed by your physician.    If you were instructed to drink 20 ounces (or until full) of Gatorade the morning of surgery, the Gatorade must be completed 1 hour before arrival time on the day of surgery .  No RED Gatorade.      DO NOT shave for two days before your procedure.  Do not wear makeup.      DO NOT wear fingernail polish (gel/regular) and/or acrylic/artificial nails on the day of surgery.   If you have had a recent manicure and would rather not remove polish or artificial nails, then the minimum requirement is that the polish/artificial nails must be removed from the middle finger on each hand.      If you are having surgery/procedure on an upper extremity, then fingernail polish/artificial fingernails must be removed for surgery.  NO EXCEPTIONS.      If you are having surgery/procedure on a lower extremity, then toenail polish on both extremities must be removed for surgery.  NO EXCEPTIONS.    Remove all jewelry (advise to go to jeweler if unable to remove).  Jewelry, especially rings, can no longer be taped for surgery.    Leave anything you consider valuable at home.    Leave your suitcase in the car until after your surgery.    Bring the following with you the  day of your procedure (when applicable)       -picture ID and insurance cards       -Co-pay/deductible required by insurance       -Medications in the original bottles (not a list) including all over-the-counter medications if not brought to PAT       -Copy of advance directive, living will or power of  documents if not brought to PAT       -CPAP or BIPAP mask and tubing (do not bring machine)       -Skin prep instruction(s) sheet       -PAT Pass    Education booklet, brochure, handout or binder related to procedure given to patient.    When applicable, an ERAS booklet was given to patient.    Pain Control After Surgery handout given to patient.    Respirex use (handout given to patient) and pneumonia prevention education provided.    Signs and Symptoms of infection discussed.    DVT Prevention education given.  Stressing the importance of ambulation.    Please apply Chlorhexadine wipes to surgical area (if instructed) the night before procedure and the AM of procedure and document date/time of applications on skin prep instruction sheet.      Per Anesthesia Request, patient instructed not to take their ACE/ARB medications on the AM of surgery.    Patient instructed to drink 20 ounces (or until full) of Gatorade and it needs to be completed 1 hour before given arrival time for procedure (NO RED Gatorade)    Patient verbalized understanding.

## 2019-09-05 NOTE — PAT
Per Anesthesia Request, patient instructed not to take their ACE/ARB medications on the AM of surgery.    Patient attended joint replacement class today during PAT visit.    Patient instructed to drink 20 ounces (or until full) of Gatorade and it needs to be completed 1 hour before given arrival time for procedure (NO RED Gatorade)    Patient verbalized understanding.    Patient to apply Chlorhexadine wipes  to surgical area (as instructed) the night before procedure and the AM of procedure. Wipes provided.    Bactroban and Chlorhexidine Prescription given during PAT visit, as well as written and verbal instructions given to patient during PAT visit.  Patient/family also instructed to complete skin prep checklist and return the checklist on the day of surgery to preoperative staff.  Patient/family verbalized understanding.

## 2019-09-09 ENCOUNTER — TELEPHONE (OUTPATIENT)
Dept: ORTHOPEDIC SURGERY | Facility: CLINIC | Age: 69
End: 2019-09-09

## 2019-09-09 NOTE — TELEPHONE ENCOUNTER
----- Message from José Luis Liao MD sent at 9/7/2019 10:28 AM EDT -----  Thanks - he is actually higher than 5 years ago - we should be fine.    ----- Message -----  From: Janelle Yanez  Sent: 9/6/2019   4:27 PM  To: José Luis Liao MD    PLATELETS 135    DOS 9/19/19

## 2019-09-19 ENCOUNTER — ANESTHESIA EVENT (OUTPATIENT)
Dept: PERIOP | Facility: HOSPITAL | Age: 69
End: 2019-09-19

## 2019-09-19 ENCOUNTER — HOSPITAL ENCOUNTER (OUTPATIENT)
Facility: HOSPITAL | Age: 69
Discharge: HOME | End: 2019-09-20
Attending: ORTHOPAEDIC SURGERY | Admitting: ORTHOPAEDIC SURGERY

## 2019-09-19 ENCOUNTER — ANESTHESIA (OUTPATIENT)
Dept: PERIOP | Facility: HOSPITAL | Age: 69
End: 2019-09-19

## 2019-09-19 ENCOUNTER — APPOINTMENT (OUTPATIENT)
Dept: GENERAL RADIOLOGY | Facility: HOSPITAL | Age: 69
End: 2019-09-19

## 2019-09-19 DIAGNOSIS — Z96.651 STATUS POST TOTAL RIGHT KNEE REPLACEMENT: ICD-10-CM

## 2019-09-19 DIAGNOSIS — M17.11 PRIMARY OSTEOARTHRITIS OF RIGHT KNEE: ICD-10-CM

## 2019-09-19 DIAGNOSIS — Z74.09 IMPAIRED MOBILITY AND ADLS: Primary | ICD-10-CM

## 2019-09-19 DIAGNOSIS — Z78.9 IMPAIRED MOBILITY AND ADLS: Primary | ICD-10-CM

## 2019-09-19 PROBLEM — E78.5 HLD (HYPERLIPIDEMIA): Status: ACTIVE | Noted: 2019-09-19

## 2019-09-19 PROBLEM — I10 HTN (HYPERTENSION): Status: ACTIVE | Noted: 2019-09-19

## 2019-09-19 PROBLEM — E03.9 HYPOTHYROID: Status: ACTIVE | Noted: 2019-09-19

## 2019-09-19 LAB — GLUCOSE BLDC GLUCOMTR-MCNC: 78 MG/DL (ref 70–130)

## 2019-09-19 PROCEDURE — 97116 GAIT TRAINING THERAPY: CPT

## 2019-09-19 PROCEDURE — 25010000002 ROPIVACAINE PER 1 MG: Performed by: NURSE ANESTHETIST, CERTIFIED REGISTERED

## 2019-09-19 PROCEDURE — 63710000001 MELOXICAM 15 MG TABLET: Performed by: ORTHOPAEDIC SURGERY

## 2019-09-19 PROCEDURE — 25010000002 MORPHINE PER 10 MG: Performed by: ORTHOPAEDIC SURGERY

## 2019-09-19 PROCEDURE — 25010000002 ROPIVACAINE PER 1 MG: Performed by: ORTHOPAEDIC SURGERY

## 2019-09-19 PROCEDURE — 25010000003 CEFAZOLIN IN DEXTROSE 2-4 GM/100ML-% SOLUTION: Performed by: ORTHOPAEDIC SURGERY

## 2019-09-19 PROCEDURE — 63710000001 HYDROCODONE-ACETAMINOPHEN 5-325 MG TABLET: Performed by: ORTHOPAEDIC SURGERY

## 2019-09-19 PROCEDURE — A9270 NON-COVERED ITEM OR SERVICE: HCPCS | Performed by: INTERNAL MEDICINE

## 2019-09-19 PROCEDURE — A9270 NON-COVERED ITEM OR SERVICE: HCPCS | Performed by: ORTHOPAEDIC SURGERY

## 2019-09-19 PROCEDURE — 63710000001 MUPIROCIN 2 % OINTMENT: Performed by: ORTHOPAEDIC SURGERY

## 2019-09-19 PROCEDURE — 73560 X-RAY EXAM OF KNEE 1 OR 2: CPT

## 2019-09-19 PROCEDURE — 63710000001 DOCUSATE SODIUM 100 MG CAPSULE: Performed by: ORTHOPAEDIC SURGERY

## 2019-09-19 PROCEDURE — 27447 TOTAL KNEE ARTHROPLASTY: CPT | Performed by: ORTHOPAEDIC SURGERY

## 2019-09-19 PROCEDURE — 63710000001 LISINOPRIL 20 MG TABLET: Performed by: NURSE PRACTITIONER

## 2019-09-19 PROCEDURE — C1713 ANCHOR/SCREW BN/BN,TIS/BN: HCPCS | Performed by: ORTHOPAEDIC SURGERY

## 2019-09-19 PROCEDURE — 63710000001 LEVOTHYROXINE 125 MCG TABLET: Performed by: NURSE PRACTITIONER

## 2019-09-19 PROCEDURE — A9270 NON-COVERED ITEM OR SERVICE: HCPCS | Performed by: NURSE PRACTITIONER

## 2019-09-19 PROCEDURE — 63710000001 FAMOTIDINE 20 MG TABLET: Performed by: ANESTHESIOLOGY

## 2019-09-19 PROCEDURE — 63710000001 OXYCODONE-ACETAMINOPHEN 5-325 MG TABLET: Performed by: INTERNAL MEDICINE

## 2019-09-19 PROCEDURE — 63710000001 ATORVASTATIN 10 MG TABLET: Performed by: NURSE PRACTITIONER

## 2019-09-19 PROCEDURE — 25010000002 PROPOFOL 10 MG/ML EMULSION: Performed by: NURSE ANESTHETIST, CERTIFIED REGISTERED

## 2019-09-19 PROCEDURE — 97161 PT EVAL LOW COMPLEX 20 MIN: CPT

## 2019-09-19 PROCEDURE — 82962 GLUCOSE BLOOD TEST: CPT

## 2019-09-19 PROCEDURE — 63710000001 PREGABALIN 150 MG CAPSULE: Performed by: ORTHOPAEDIC SURGERY

## 2019-09-19 PROCEDURE — A9270 NON-COVERED ITEM OR SERVICE: HCPCS | Performed by: ANESTHESIOLOGY

## 2019-09-19 PROCEDURE — 25010000002 CEFAZOLIN PER 500 MG: Performed by: ORTHOPAEDIC SURGERY

## 2019-09-19 PROCEDURE — C1776 JOINT DEVICE (IMPLANTABLE): HCPCS | Performed by: ORTHOPAEDIC SURGERY

## 2019-09-19 DEVICE — IMPLANTABLE DEVICE: Type: IMPLANTABLE DEVICE | Site: PATELLA | Status: FUNCTIONAL

## 2019-09-19 DEVICE — CMT BONE SIMPLEX/P FULL DOSE 10/PK: Type: IMPLANTABLE DEVICE | Site: PATELLA | Status: FUNCTIONAL

## 2019-09-19 DEVICE — GENESIS II NON-POROUS TIBIAL                                    BASEPLATE SIZE 6 RIGHT
Type: IMPLANTABLE DEVICE | Site: PATELLA | Status: FUNCTIONAL
Brand: GENESIS II

## 2019-09-19 DEVICE — LEGION PS HIGH FLEX XLPE SZ 5-6 10MM
Type: IMPLANTABLE DEVICE | Site: PATELLA | Status: FUNCTIONAL
Brand: LEGION

## 2019-09-19 DEVICE — LEGION POSTERIOR STABILIZED                                    OXINIUM FEMORAL SIZE 7 RIGHT
Type: IMPLANTABLE DEVICE | Site: PATELLA | Status: FUNCTIONAL
Brand: LEGION

## 2019-09-19 DEVICE — GENESIS II RESURFACING PATELLAR 35MM
Type: IMPLANTABLE DEVICE | Site: PATELLA | Status: FUNCTIONAL
Brand: GENESIS II

## 2019-09-19 RX ORDER — HYDROMORPHONE HYDROCHLORIDE 1 MG/ML
0.5 INJECTION, SOLUTION INTRAMUSCULAR; INTRAVENOUS; SUBCUTANEOUS
Status: DISCONTINUED | OUTPATIENT
Start: 2019-09-19 | End: 2019-09-19 | Stop reason: HOSPADM

## 2019-09-19 RX ORDER — TRANEXAMIC ACID 100 MG/ML
INJECTION, SOLUTION INTRAVENOUS AS NEEDED
Status: DISCONTINUED | OUTPATIENT
Start: 2019-09-19 | End: 2019-09-19 | Stop reason: SURG

## 2019-09-19 RX ORDER — NALOXONE HCL 0.4 MG/ML
0.4 VIAL (ML) INJECTION
Status: DISCONTINUED | OUTPATIENT
Start: 2019-09-19 | End: 2019-09-20 | Stop reason: HOSPADM

## 2019-09-19 RX ORDER — OXYCODONE HYDROCHLORIDE AND ACETAMINOPHEN 5; 325 MG/1; MG/1
1 TABLET ORAL EVERY 4 HOURS PRN
Status: DISCONTINUED | OUTPATIENT
Start: 2019-09-19 | End: 2019-09-20 | Stop reason: HOSPADM

## 2019-09-19 RX ORDER — LIDOCAINE HYDROCHLORIDE 10 MG/ML
0.5 INJECTION, SOLUTION EPIDURAL; INFILTRATION; INTRACAUDAL; PERINEURAL ONCE AS NEEDED
Status: COMPLETED | OUTPATIENT
Start: 2019-09-19 | End: 2019-09-19

## 2019-09-19 RX ORDER — LEVOTHYROXINE SODIUM 0.12 MG/1
125 TABLET ORAL DAILY
Status: DISCONTINUED | OUTPATIENT
Start: 2019-09-19 | End: 2019-09-20 | Stop reason: HOSPADM

## 2019-09-19 RX ORDER — ACETAMINOPHEN 160 MG/5ML
650 SOLUTION ORAL EVERY 4 HOURS PRN
Status: DISCONTINUED | OUTPATIENT
Start: 2019-09-19 | End: 2019-09-20 | Stop reason: HOSPADM

## 2019-09-19 RX ORDER — HYDROMORPHONE HYDROCHLORIDE 1 MG/ML
0.5 INJECTION, SOLUTION INTRAMUSCULAR; INTRAVENOUS; SUBCUTANEOUS
Status: DISCONTINUED | OUTPATIENT
Start: 2019-09-19 | End: 2019-09-20 | Stop reason: HOSPADM

## 2019-09-19 RX ORDER — SODIUM CHLORIDE 9 MG/ML
120 INJECTION, SOLUTION INTRAVENOUS CONTINUOUS
Status: DISCONTINUED | OUTPATIENT
Start: 2019-09-19 | End: 2019-09-20 | Stop reason: HOSPADM

## 2019-09-19 RX ORDER — LISINOPRIL 20 MG/1
20 TABLET ORAL
Status: DISCONTINUED | OUTPATIENT
Start: 2019-09-19 | End: 2019-09-20 | Stop reason: HOSPADM

## 2019-09-19 RX ORDER — NALOXONE HCL 0.4 MG/ML
0.1 VIAL (ML) INJECTION
Status: DISCONTINUED | OUTPATIENT
Start: 2019-09-19 | End: 2019-09-19 | Stop reason: SDUPTHER

## 2019-09-19 RX ORDER — LABETALOL HYDROCHLORIDE 5 MG/ML
10 INJECTION, SOLUTION INTRAVENOUS EVERY 4 HOURS PRN
Status: DISCONTINUED | OUTPATIENT
Start: 2019-09-19 | End: 2019-09-20 | Stop reason: HOSPADM

## 2019-09-19 RX ORDER — BUPIVACAINE HYDROCHLORIDE 5 MG/ML
INJECTION, SOLUTION PERINEURAL
Status: COMPLETED | OUTPATIENT
Start: 2019-09-19 | End: 2019-09-19

## 2019-09-19 RX ORDER — MELOXICAM 15 MG/1
15 TABLET ORAL ONCE
Status: COMPLETED | OUTPATIENT
Start: 2019-09-19 | End: 2019-09-19

## 2019-09-19 RX ORDER — ASPIRIN 325 MG
325 TABLET, DELAYED RELEASE (ENTERIC COATED) ORAL DAILY
Status: DISCONTINUED | OUTPATIENT
Start: 2019-09-20 | End: 2019-09-20 | Stop reason: HOSPADM

## 2019-09-19 RX ORDER — SODIUM CHLORIDE 0.9 % (FLUSH) 0.9 %
1-10 SYRINGE (ML) INJECTION AS NEEDED
Status: DISCONTINUED | OUTPATIENT
Start: 2019-09-19 | End: 2019-09-20 | Stop reason: HOSPADM

## 2019-09-19 RX ORDER — ROPIVACAINE HYDROCHLORIDE 5 MG/ML
INJECTION, SOLUTION EPIDURAL; INFILTRATION; PERINEURAL AS NEEDED
Status: DISCONTINUED | OUTPATIENT
Start: 2019-09-19 | End: 2019-09-19 | Stop reason: HOSPADM

## 2019-09-19 RX ORDER — DOCUSATE SODIUM 100 MG/1
100 CAPSULE, LIQUID FILLED ORAL 2 TIMES DAILY PRN
Status: DISCONTINUED | OUTPATIENT
Start: 2019-09-19 | End: 2019-09-20 | Stop reason: HOSPADM

## 2019-09-19 RX ORDER — ONDANSETRON 4 MG/1
4 TABLET, FILM COATED ORAL EVERY 6 HOURS PRN
Status: DISCONTINUED | OUTPATIENT
Start: 2019-09-19 | End: 2019-09-20 | Stop reason: HOSPADM

## 2019-09-19 RX ORDER — HYDROCODONE BITARTRATE AND ACETAMINOPHEN 5; 325 MG/1; MG/1
1 TABLET ORAL EVERY 4 HOURS PRN
Status: DISCONTINUED | OUTPATIENT
Start: 2019-09-19 | End: 2019-09-19

## 2019-09-19 RX ORDER — ASPIRIN 81 MG/1
81 TABLET ORAL DAILY
Status: ON HOLD | COMMUNITY
End: 2019-09-20 | Stop reason: SDUPTHER

## 2019-09-19 RX ORDER — PREGABALIN 150 MG/1
150 CAPSULE ORAL ONCE
Status: COMPLETED | OUTPATIENT
Start: 2019-09-19 | End: 2019-09-19

## 2019-09-19 RX ORDER — BISACODYL 5 MG/1
10 TABLET, DELAYED RELEASE ORAL DAILY PRN
Status: DISCONTINUED | OUTPATIENT
Start: 2019-09-19 | End: 2019-09-20 | Stop reason: HOSPADM

## 2019-09-19 RX ORDER — ONDANSETRON 2 MG/ML
4 INJECTION INTRAMUSCULAR; INTRAVENOUS EVERY 6 HOURS PRN
Status: DISCONTINUED | OUTPATIENT
Start: 2019-09-19 | End: 2019-09-19 | Stop reason: SDUPTHER

## 2019-09-19 RX ORDER — MAGNESIUM HYDROXIDE 1200 MG/15ML
LIQUID ORAL AS NEEDED
Status: DISCONTINUED | OUTPATIENT
Start: 2019-09-19 | End: 2019-09-19 | Stop reason: HOSPADM

## 2019-09-19 RX ORDER — MELOXICAM 7.5 MG/1
15 TABLET ORAL DAILY
Status: DISCONTINUED | OUTPATIENT
Start: 2019-09-20 | End: 2019-09-20 | Stop reason: HOSPADM

## 2019-09-19 RX ORDER — SODIUM CHLORIDE, SODIUM LACTATE, POTASSIUM CHLORIDE, CALCIUM CHLORIDE 600; 310; 30; 20 MG/100ML; MG/100ML; MG/100ML; MG/100ML
9 INJECTION, SOLUTION INTRAVENOUS CONTINUOUS PRN
Status: DISCONTINUED | OUTPATIENT
Start: 2019-09-19 | End: 2019-09-19 | Stop reason: HOSPADM

## 2019-09-19 RX ORDER — BISACODYL 10 MG
10 SUPPOSITORY, RECTAL RECTAL DAILY PRN
Status: DISCONTINUED | OUTPATIENT
Start: 2019-09-19 | End: 2019-09-20 | Stop reason: HOSPADM

## 2019-09-19 RX ORDER — ONDANSETRON 2 MG/ML
4 INJECTION INTRAMUSCULAR; INTRAVENOUS EVERY 6 HOURS PRN
Status: DISCONTINUED | OUTPATIENT
Start: 2019-09-19 | End: 2019-09-20 | Stop reason: HOSPADM

## 2019-09-19 RX ORDER — ATORVASTATIN CALCIUM 10 MG/1
10 TABLET, FILM COATED ORAL DAILY
Status: DISCONTINUED | OUTPATIENT
Start: 2019-09-19 | End: 2019-09-20 | Stop reason: HOSPADM

## 2019-09-19 RX ORDER — FENTANYL CITRATE 50 UG/ML
50 INJECTION, SOLUTION INTRAMUSCULAR; INTRAVENOUS
Status: DISCONTINUED | OUTPATIENT
Start: 2019-09-19 | End: 2019-09-19 | Stop reason: HOSPADM

## 2019-09-19 RX ORDER — SODIUM CHLORIDE 0.9 % (FLUSH) 0.9 %
3 SYRINGE (ML) INJECTION EVERY 12 HOURS SCHEDULED
Status: DISCONTINUED | OUTPATIENT
Start: 2019-09-19 | End: 2019-09-20 | Stop reason: HOSPADM

## 2019-09-19 RX ORDER — ACETAMINOPHEN 500 MG
1000 TABLET ORAL ONCE
Status: DISCONTINUED | OUTPATIENT
Start: 2019-09-19 | End: 2019-09-19 | Stop reason: HOSPADM

## 2019-09-19 RX ORDER — ACETAMINOPHEN 650 MG/1
650 SUPPOSITORY RECTAL EVERY 4 HOURS PRN
Status: DISCONTINUED | OUTPATIENT
Start: 2019-09-19 | End: 2019-09-20 | Stop reason: HOSPADM

## 2019-09-19 RX ORDER — BUPIVACAINE HYDROCHLORIDE 2.5 MG/ML
INJECTION, SOLUTION EPIDURAL; INFILTRATION; INTRACAUDAL
Status: COMPLETED | OUTPATIENT
Start: 2019-09-19 | End: 2019-09-19

## 2019-09-19 RX ORDER — CEFAZOLIN SODIUM 2 G/100ML
2 INJECTION, SOLUTION INTRAVENOUS ONCE
Status: COMPLETED | OUTPATIENT
Start: 2019-09-19 | End: 2019-09-19

## 2019-09-19 RX ORDER — FAMOTIDINE 20 MG/1
20 TABLET, FILM COATED ORAL
Status: COMPLETED | OUTPATIENT
Start: 2019-09-19 | End: 2019-09-19

## 2019-09-19 RX ORDER — ACETAMINOPHEN 325 MG/1
650 TABLET ORAL EVERY 4 HOURS PRN
Status: DISCONTINUED | OUTPATIENT
Start: 2019-09-19 | End: 2019-09-20 | Stop reason: HOSPADM

## 2019-09-19 RX ORDER — MORPHINE SULFATE 4 MG/ML
4 INJECTION, SOLUTION INTRAMUSCULAR; INTRAVENOUS
Status: DISCONTINUED | OUTPATIENT
Start: 2019-09-19 | End: 2019-09-20 | Stop reason: HOSPADM

## 2019-09-19 RX ORDER — CEFAZOLIN SODIUM 2 G/100ML
2 INJECTION, SOLUTION INTRAVENOUS EVERY 8 HOURS
Status: COMPLETED | OUTPATIENT
Start: 2019-09-19 | End: 2019-09-20

## 2019-09-19 RX ADMIN — FAMOTIDINE 20 MG: 20 TABLET ORAL at 06:40

## 2019-09-19 RX ADMIN — SODIUM CHLORIDE, POTASSIUM CHLORIDE, SODIUM LACTATE AND CALCIUM CHLORIDE 9 ML/HR: 600; 310; 30; 20 INJECTION, SOLUTION INTRAVENOUS at 06:40

## 2019-09-19 RX ADMIN — ATORVASTATIN CALCIUM 10 MG: 10 TABLET, FILM COATED ORAL at 12:18

## 2019-09-19 RX ADMIN — ROPIVACAINE HYDROCHLORIDE 10 ML/HR: 5 INJECTION, SOLUTION EPIDURAL; INFILTRATION; PERINEURAL at 10:09

## 2019-09-19 RX ADMIN — MUPIROCIN 1 APPLICATION: 20 OINTMENT TOPICAL at 06:40

## 2019-09-19 RX ADMIN — LIDOCAINE HYDROCHLORIDE 0.5 ML: 10 INJECTION, SOLUTION EPIDURAL; INFILTRATION; INTRACAUDAL; PERINEURAL at 06:39

## 2019-09-19 RX ADMIN — BUPIVACAINE HYDROCHLORIDE 25 ML: 2.5 INJECTION, SOLUTION EPIDURAL; INFILTRATION; INTRACAUDAL; PERINEURAL at 09:45

## 2019-09-19 RX ADMIN — BUPIVACAINE HYDROCHLORIDE 2 ML: 5 INJECTION, SOLUTION PERINEURAL at 07:58

## 2019-09-19 RX ADMIN — LEVOTHYROXINE SODIUM 125 MCG: 125 TABLET ORAL at 12:18

## 2019-09-19 RX ADMIN — SODIUM CHLORIDE 120 ML/HR: 9 INJECTION, SOLUTION INTRAVENOUS at 14:05

## 2019-09-19 RX ADMIN — PREGABALIN 150 MG: 150 CAPSULE ORAL at 06:39

## 2019-09-19 RX ADMIN — DOCUSATE SODIUM 100 MG: 100 CAPSULE, LIQUID FILLED ORAL at 20:25

## 2019-09-19 RX ADMIN — OXYCODONE HYDROCHLORIDE AND ACETAMINOPHEN 1 TABLET: 5; 325 TABLET ORAL at 20:25

## 2019-09-19 RX ADMIN — SODIUM CHLORIDE, POTASSIUM CHLORIDE, SODIUM LACTATE AND CALCIUM CHLORIDE: 600; 310; 30; 20 INJECTION, SOLUTION INTRAVENOUS at 07:31

## 2019-09-19 RX ADMIN — CEFAZOLIN 2 G: 10 INJECTION, POWDER, FOR SOLUTION INTRAVENOUS; PARENTERAL at 15:23

## 2019-09-19 RX ADMIN — TRANEXAMIC ACID 1000 MG: 100 INJECTION, SOLUTION INTRAVENOUS at 08:36

## 2019-09-19 RX ADMIN — LISINOPRIL 20 MG: 20 TABLET ORAL at 12:18

## 2019-09-19 RX ADMIN — TRANEXAMIC ACID 1000 MG: 100 INJECTION, SOLUTION INTRAVENOUS at 07:31

## 2019-09-19 RX ADMIN — MORPHINE SULFATE 4 MG: 4 INJECTION INTRAVENOUS at 18:30

## 2019-09-19 RX ADMIN — HYDROCODONE BITARTRATE AND ACETAMINOPHEN 1 TABLET: 5; 325 TABLET ORAL at 14:05

## 2019-09-19 RX ADMIN — MELOXICAM 15 MG: 15 TABLET ORAL at 06:39

## 2019-09-19 RX ADMIN — PROPOFOL 150 MCG/KG/MIN: 10 INJECTION, EMULSION INTRAVENOUS at 07:48

## 2019-09-19 RX ADMIN — MORPHINE SULFATE 4 MG: 4 INJECTION INTRAVENOUS at 15:22

## 2019-09-19 RX ADMIN — CEFAZOLIN SODIUM 2 G: 2 INJECTION, SOLUTION INTRAVENOUS at 07:31

## 2019-09-19 NOTE — ADDENDUM NOTE
Addendum  created 09/19/19 1034 by Jovany Nagel, CRNA    Diagnosis association updated, Intraprocedure Blocks edited, Sign clinical note

## 2019-09-19 NOTE — PLAN OF CARE
Problem: Patient Care Overview  Goal: Plan of Care Review  Outcome: Ongoing (interventions implemented as appropriate)   09/19/19 4338   Plan of Care Review   Progress improving   OTHER   Outcome Summary Patient ambulated 350 feet with RW and step through gait pattern, limited by pain. ROM to be initiated POD#1. Plan is d/c home with family and HHPT. Will continue to progress as able. PADD score 9.    Coping/Psychosocial   Plan of Care Reviewed With patient;spouse

## 2019-09-19 NOTE — H&P
Pre-Op H&P  Mir Stokes  [id number]  1950    Chief complaint: Right knee pain    HPI:  Patient is a 68 y.o.male who presents with primary osteoarthritis of the right knee.  He had no significant relief with the Visco supplementation injection series.  He is complaining of 8-10 out of 10 pain, worse with walking, standing, sitting and climbing stairs.  He also complains of pain at night, as well as with work and leisure activities.  No groin pain.  The pain is associated with swelling, popping, stiffness and giving way.  The pain is dull, aching and shooting.  He has reached the point where he would like to proceed with knee replacement surgery.  Of note, he had a successful outcome with a left total knee replacement in 2014.  No history of clots nor clotting disorders.  Last injection was 5/8/2019.     X-ray imaging was performed with findings that demonstrate mild worsening of arthritis, with medial joint space narrowing, near bone-on-bone contact medial compartment, with patellofemoral spurring consistent with degeneration of the knee.    She presents today for a right total knee arthroplasty    Review of Systems:  General ROS: negative for chills, fever or skin lesions;  No changes since last office visit  Cardiovascular ROS: no chest pain or dyspnea on exertion  Respiratory ROS: no cough, shortness of breath, or wheezing    Allergies: No Known Allergies    Home Meds:    No current facility-administered medications on file prior to encounter.      Current Outpatient Medications on File Prior to Encounter   Medication Sig Dispense Refill   • aspirin 81 MG EC tablet Take 81 mg by mouth Daily.     • lisinopril-hydrochlorothiazide (PRINZIDE,ZESTORETIC) 20-12.5 MG per tablet Take 1 tablet by mouth Daily.     • simvastatin (ZOCOR) 10 MG tablet Take 10 mg by mouth Every Night.     • SYNTHROID 125 MCG tablet Take 125 mcg by mouth Daily.     • [DISCONTINUED] Chlorhexidine Gluconate 4 % solution Shower  "with hibiclens solution as directed for 5 days prior to surgery 237 mL 0   • [DISCONTINUED] mupirocin (BACTROBAN) 2 % ointment Apply into the nostril(s) as directed by provider 2 (Two) Times a Day 5 days prior to surgery 22 g 0       PMH:   Past Medical History:   Diagnosis Date   • Arthritis    • Hypertension    • Hypothyroid    • Wears partial dentures     single lower tooth      PSH:    Past Surgical History:   Procedure Laterality Date   • CATARACT EXTRACTION Bilateral    • COLONOSCOPY     • KNEE ARTHROPLASTY Left        Social History:   Tobacco:   Social History     Tobacco Use   Smoking Status Former Smoker   • Last attempt to quit:    • Years since quittin.7   Smokeless Tobacco Former User   • Types: Chew   Tobacco Comment    occasional cigar  but stopped 30 yr ago quit chew tobacco 5 yr ago       Alcohol:     Social History     Substance and Sexual Activity   Alcohol Use Yes   • Alcohol/week: 1.2 oz   • Types: 2 Shots of liquor per week    Comment: occasional       Vitals:           /73 (BP Location: Right arm, Patient Position: Lying)   Pulse 71   Temp 97.8 °F (36.6 °C) (Tympanic)   Resp 18   Ht 180.3 cm (71\")   Wt 103 kg (226 lb)   SpO2 96%   BMI 31.52 kg/m²     Physical Exam:  General Appearance:    Alert, cooperative, no distress, appears stated age   Head:    Normocephalic, without obvious abnormality, atraumatic   Lungs:     Clear to auscultation bilaterally, respirations unlabored    Heart:   Regular rate and rhythm, S1 and S2 normal, no murmur, rub    or gallop    Abdomen:    Soft, non-tender.  +bowel sounds   Breast Exam:    deferred   Genitalia:    deferred   Extremities:   Extremities normal, atraumatic, no cyanosis or edema   Skin:   Skin color, texture, turgor normal, no rashes or lesions   Neurologic:   Grossly intact   Results Review  LABS:  Lab Results   Component Value Date    WBC 6.43 2019    HGB 14.8 2019    HCT 43.8 2019    MCV 99.3 (H) " 09/05/2019     (L) 09/05/2019    NEUTROABS 4.29 09/05/2019    GLUCOSE 78 09/05/2019    BUN 27 (H) 09/05/2019    CREATININE 1.27 09/05/2019    EGFRIFNONA 56 (L) 09/05/2019     09/05/2019    K 4.4 09/05/2019     09/05/2019    CO2 27.0 09/05/2019    CALCIUM 9.6 09/05/2019       RADIOLOGY:  Imaging Results (last 72 hours)     ** No results found for the last 72 hours. **          I reviewed the patient's new clinical results.    Impression:   Primary osteoarthritis of the right knee    Plan:   Total knee arthroplasty, right    Lisa Rey PA-C   9/19/2019   6:52 AM     Agree with above.  Plan for right total knee arthroplasty.    José Luis Liao MD  09/19/19  7:25 AM

## 2019-09-19 NOTE — ANESTHESIA POSTPROCEDURE EVALUATION
"Patient: Mir Stokes    Procedure Summary     Date:  09/19/19 Room / Location:   [facility]    Anesthesia Start:  0731 Anesthesia Stop:      Procedure:  TOTAL RIGHT KNEE ARTHROPLASTY (Right Knee) Diagnosis:       Primary osteoarthritis of right knee      (Primary osteoarthritis of right knee [M17.11])    Surgeon:  José Luis Liao MD Provider:  Ray Calixto MD    Anesthesia Type:  general with block ASA Status:  3          Anesthesia Type: general with block  Last vitals  BP   138/73 (09/19/19 0629)   Temp   97.8 °F (36.6 °C) (09/19/19 0629)   Pulse   71 (09/19/19 0629)   Resp   18 (09/19/19 0629)     SpO2   96 % (09/19/19 0629)     Post Anesthesia Care and Evaluation    Patient location during evaluation: PACU  Patient participation: complete - patient participated  Level of consciousness: awake and responsive to verbal stimuli  Pain score: 2  Pain management: adequate  Airway patency: patent  Anesthetic complications: No anesthetic complications    Cardiovascular status: acceptable  Respiratory status: acceptable  Hydration status: acceptable    Comments: Pt awake and responsive. SV. VSS. Report to RN. Patient Vitals in the past 24 hrs:  09/19/19 0629, BP:138/73, Temp:97.8 °F (36.6 °C), Temp src:Tympanic, Pulse:71, Resp:18, SpO2:96 %, Height:180.3 cm (71\"), Weight:103 kg (226 lb)  133/78. p 72. r 16. t 98.1                  "

## 2019-09-19 NOTE — PLAN OF CARE
Problem: Patient Care Overview  Goal: Plan of Care Review  Outcome: Ongoing (interventions implemented as appropriate)   09/19/19 4057   Plan of Care Review   Progress improving   OTHER   Outcome Summary VSS. Pain controlled with PO and IV pain medication. Has pain in anterior knee. Ropivicaine pump 10ml/hr. Numbess and tingling has been resolved. Came to unit at 1050.   Coping/Psychosocial   Plan of Care Reviewed With patient       Problem: Pain, Chronic (Adult)  Goal: Identify Related Risk Factors and Signs and Symptoms  Outcome: Ongoing (interventions implemented as appropriate)    Goal: Acceptable Pain/Comfort Level and Functional Ability  Outcome: Ongoing (interventions implemented as appropriate)      Problem: Knee Arthroplasty (Total, Partial) (Adult)  Goal: Signs and Symptoms of Listed Potential Problems Will be Absent, Minimized or Managed (Knee Arthroplasty)  Outcome: Ongoing (interventions implemented as appropriate)

## 2019-09-19 NOTE — THERAPY EVALUATION
Patient Name: Mir Stokes  : 1950    MRN: [id number]                              Today's Date: 2019       Admit Date: 2019    Visit Dx:     ICD-10-CM ICD-9-CM   1. Primary osteoarthritis of right knee M17.11 715.16     Patient Active Problem List   Diagnosis   • Primary osteoarthritis of right knee   • Status post total right knee replacement   • HTN (hypertension)   • HLD (hyperlipidemia)   • Hypothyroid     Past Medical History:   Diagnosis Date   • Arthritis    • Hypertension    • Hypothyroid    • Wears partial dentures     single lower tooth      Past Surgical History:   Procedure Laterality Date   • CATARACT EXTRACTION Bilateral    • COLONOSCOPY     • KNEE ARTHROPLASTY Left      General Information     Row Name 19 1341          PT Evaluation Time/Intention    Document Type  evaluation  -LR     Mode of Treatment  physical therapy;individual therapy  -LR     Row Name 19 1341          General Information    Patient Profile Reviewed?  yes  -LR     Prior Level of Function  min assist:;all household mobility;community mobility;gait;transfer;bed mobility;ADL's;home management;cooking;cleaning;using stairs;shopping;independent:;driving  -LR     Existing Precautions/Restrictions  fall;other (see comments) R adductor canal nerve catheter  -LR     Barriers to Rehab  previous functional deficit  -LR     Row Name 19 1341          Relationship/Environment    Lives With  spouse spouse works during the day, will not have assist during the day  -LR     Row Name 19 1341          Resource/Environmental Concerns    Current Living Arrangements  home/apartment/condo  -LR     Row Name 19 1341          Home Main Entrance    Number of Stairs, Main Entrance  two  -LR     Stair Railings, Main Entrance  none  -LR     Row Name 19 1341          Stairs Within Home, Primary    Number of Stairs, Within Home, Primary  none  -LR     Row Name 19 1341           Cognitive Assessment/Intervention- PT/OT    Orientation Status (Cognition)  oriented x 4  -LR     Row Name 09/19/19 1341          Safety Issues, Functional Mobility    Safety Issues Affecting Function (Mobility)  positioning of assistive device;safety precaution awareness;sequencing abilities;safety precautions follow-through/compliance;awareness of need for assistance;insight into deficits/self awareness  -LR       User Key  (r) = Recorded By, (t) = Taken By, (c) = Cosigned By    Initials Name Provider Type    LR Renae Martinez, PT Physical Therapist        Mobility     Row Name 09/19/19 1316          Bed Mobility Assessment/Treatment    Bed Mobility Assessment/Treatment  supine-sit  -LR     Supine-Sit [place] (Bed Mobility)  verbal cues;supervision  -LR     Assistive Device (Bed Mobility)  head of bed elevated;bed rails  -LR     Comment (Bed Mobility)  Verbal cues to move LEs towards EOB and to push up from bed to raise trunk into sitting and to scoot hips out to get feet on floor. Denied dizziness upon sitting up.   -LR     Row Name 09/19/19 1316          Transfer Assessment/Treatment    Comment (Transfers)  Verbal cues to push up from bed to stand and to reach back for chair to lower into sitting. Verbal cues to step R LE out before t/f for comfort.   -LR     Row Name 09/19/19 1316          Sit-Stand Transfer    Sit-Stand [place] (Transfers)  verbal cues;contact guard;2 person assist  -LR     Assistive Device (Sit-Stand Transfers)  walker, front-wheeled  -LR     Row Name 09/19/19 1316          Gait/Stairs Assessment/Training    64596 - Gait Training Minutes   6  -LR     [place] Level (Gait)  verbal cues;contact guard;2 person assist  -LR     Assistive Device (Gait)  walker, front-wheeled  -LR     Distance in Feet (Gait)  350  -LR     Pattern (Gait)  step-through  -LR     Deviations/Abnormal Patterns (Gait)  right sided deviations;antalgic;bilateral deviations;aracelis decreased;gait speed  decreased;stride length decreased  -LR     Bilateral Gait Deviations  heel strike decreased;forward flexed posture  -LR     Right Sided Gait Deviations  weight shift ability decreased  -LR     Comment (Gait/Stairs)  Patient ambulated with step through gait pattern at slow pace. Verbal cues for increased R LE weight bearing/stance phase, decreased UE weight bearing, increased step length, and increased R knee flexion during swing phase. Improved with cues for correction. Gait limited by pain.   -LR     Row Name 09/19/19 1316          Mobility Assessment/Intervention    Extremity Weight-bearing Status  right lower extremity  -LR     Right Lower Extremity (Weight-bearing Status)  weight-bearing as tolerated (WBAT)  -LR       User Key  (r) = Recorded By, (t) = Taken By, (c) = Cosigned By    Initials Name Provider Type    LR Renae Martinez, PT Physical Therapist        Obj/Interventions     Row Name 09/19/19 1316          General ROM    GENERAL ROM COMMENTS  L LE AROM WFL; R knee AROM impaired 25%, will formally measure POD#1.   -LR     Row Name 09/19/19 1316          MMT (Manual Muscle Testing)    General MMT Comments  L LE WFL; R knee functionally 4-/5, independent with SLR, no knee buckling with weight bearing  -LR     Row Name 09/19/19 1316          Therapeutic Exercise    Lower Extremity (Therapeutic Exercise)  gluteal sets;quad sets, right  -LR     Lower Extremity Range of Motion (Therapeutic Exercise)  ankle dorsiflexion/plantar flexion, right  -LR     Exercise Type (Therapeutic Exercise)  AROM (active range of motion);isometric contraction, static;isotonic contraction, concentric  -LR     Position (Therapeutic Exercise)  supine  -LR     Sets/Reps (Therapeutic Exercise)  x10 reps each  -LR     Comment (Therapeutic Exercise)  cues for technique; able to actively DF bilaterally  -LR     Row Name 09/19/19 1316          Sensory Assessment/Intervention    Sensory General Assessment  no sensation deficits  identified denies numbness/tingling; light touch equal and intact  -LR       User Key  (r) = Recorded By, (t) = Taken By, (c) = Cosigned By    Initials Name Provider Type    LR Renae Martinez, PT Physical Therapist        Goals/Plan     Row Name 09/19/19 1316          Bed Mobility Goal 1 (PT)    Activity/Assistive Device (Bed Mobility Goal 1, PT)  sit to supine/supine to sit  -LR     [place] Level/Cues Needed (Bed Mobility Goal 1, PT)  conditional independence  -LR     Time Frame (Bed Mobility Goal 1, PT)  long term goal (LTG);3 days  -LR     Progress/Outcomes (Bed Mobility Goal 1, PT)  goal ongoing  -LR     Row Name 09/19/19 1316          Transfer Goal 1 (PT)    Activity/Assistive Device (Transfer Goal 1, PT)  sit-to-stand/stand-to-sit;walker, rolling  -LR     [place] Level/Cues Needed (Transfer Goal 1, PT)  conditional independence  -LR     Time Frame (Transfer Goal 1, PT)  long term goal (LTG);3 days  -LR     Progress/Outcome (Transfer Goal 1, PT)  goal ongoing  -LR     Row Name 09/19/19 1316          Gait Training Goal 1 (PT)    Activity/Assistive Device (Gait Training Goal 1, PT)  gait (walking locomotion);walker, rolling  -LR     [place] Level (Gait Training Goal 1, PT)  conditional independence  -LR     Distance (Gait Goal 1, PT)  500 feet  -LR     Time Frame (Gait Training Goal 1, PT)  long term goal (LTG);3 days  -LR     Progress/Outcome (Gait Training Goal 1, PT)  goal ongoing  -LR     Row Name 09/19/19 1316          ROM Goal 1 (PT)    Time Frame (ROM Goal 1, PT)  long term goal (LTG);3 days  -LR     Progress/Outcome (ROM Goal 1, PT)  goal ongoing  -LR     Row Name 09/19/19 1316          Stairs Goal 1 (PT)    Activity/Assistive Device (Stairs Goal 1, PT)  ascending stairs;descending stairs;step-to-step;cane, straight HHA  -LR     [place] Level/Cues Needed (Stairs Goal 1, PT)  contact guard assist  -LR     Number of Stairs (Stairs Goal 1, PT)  2  -LR     Time Frame (Stairs Goal  1, PT)  long term goal (LTG);3 days  -LR     Progress/Outcome (Stairs Goal 1, PT)  goal ongoing  -LR       User Key  (r) = Recorded By, (t) = Taken By, (c) = Cosigned By    Initials Name Provider Type    Renae [place], PT Physical Therapist        Clinical Impression     Row Name 09/19/19 1316          Pain Assessment    Additional Documentation  Pain Scale: Numbers Pre/Post-Treatment (Group)  -LR     Row Name 09/19/19 1316          Pain Scale: Numbers Pre/Post-Treatment    Pain Scale: Numbers, Pretreatment  0/10 - no pain  -LR     Pain Scale: Numbers, Post-Treatment  3/10  -LR     Pain Location - Side  Right  -LR     Pain Location - Orientation  generalized  -LR     Pain Location  knee  -LR     Pain Intervention(s)  Ambulation/increased activity;Repositioned;Cold applied  -LR     Row Name 09/19/19 1316          Plan of Care Review    Plan of Care Reviewed With  patient;spouse  -LR     [facility] Name 09/19/19 1316          Physical Therapy Clinical Impression    Patient/Family Goals Statement (PT Clinical Impression)  go home, decrease pain  -LR     Criteria for Skilled Interventions Met (PT Clinical Impression)  yes;treatment indicated  -LR     Rehab Potential (PT Clinical Summary)  good, to achieve stated therapy goals  -LR     Row Name 09/19/19 1316          Positioning and Restraints    Pre-Treatment Position  in bed  -LR     Post Treatment Position  bathroom  -LR     Bathroom  notified nsg;sitting;call light within reach;encouraged to call for assist;with family/caregiver  -LR       User Key  (r) = Recorded By, (t) = Taken By, (c) = Cosigned By    Initials Name Provider Type    Renae Armenta, PT Physical Therapist        Outcome Measures     Row Name 09/19/19 1316          How much help from another person do you currently need...    Turning from your back to your side while in flat bed without using bedrails?  4  -LR     Moving from lying on back to sitting on the side of a flat bed without  bedrails?  3  -LR     Moving to and from a bed to a chair (including a wheelchair)?  3  -LR     Standing up from a chair using your arms (e.g., wheelchair, bedside chair)?  3  -LR     Climbing 3-5 steps with a railing?  3  -LR     To walk in hospital room?  3  -LR     AM-PAC 6 Clicks Score (PT)  19  -LR     Row Name 09/19/19 1316          Functional Assessment    Outcome Measure Options  AM-PAC 6 Clicks Basic Mobility (PT)  -LR       User Key  (r) = Recorded By, (t) = Taken By, (c) = Cosigned By    Initials Name Provider Type    Renae Armenta, PT Physical Therapist        Physical Therapy Education     Title: PT OT SLP Therapies (Done)     Topic: Physical Therapy (Done)     Point: Mobility training (Done)     Learning Progress Summary           Patient Acceptance, E,D, VU,NR by LR at 9/19/2019  1:16 PM    Comment:  Educated on precautions, weight bearing status, correct supine to sit t/f technique, correct sit<->stand t/f technique, correct gait mechanics, and progression of POC.   Significant Other Acceptance, E,D, VU,NR by LR at 9/19/2019  1:16 PM    Comment:  Educated on precautions, weight bearing status, correct supine to sit t/f technique, correct sit<->stand t/f technique, correct gait mechanics, and progression of POC.                   Point: Home exercise program (Done)     Learning Progress Summary           Patient Acceptance, E,D, VU,NR by LR at 9/19/2019  1:16 PM    Comment:  Educated on precautions, weight bearing status, correct supine to sit t/f technique, correct sit<->stand t/f technique, correct gait mechanics, and progression of POC.   Significant Other Acceptance, E,D, VU,NR by LR at 9/19/2019  1:16 PM    Comment:  Educated on precautions, weight bearing status, correct supine to sit t/f technique, correct sit<->stand t/f technique, correct gait mechanics, and progression of POC.                   Point: Body mechanics (Done)     Learning Progress Summary           Patient  Acceptance, E,D, VU,NR by LR at 9/19/2019  1:16 PM    Comment:  Educated on precautions, weight bearing status, correct supine to sit t/f technique, correct sit<->stand t/f technique, correct gait mechanics, and progression of POC.   Significant Other Acceptance, E,D, VU,NR by LR at 9/19/2019  1:16 PM    Comment:  Educated on precautions, weight bearing status, correct supine to sit t/f technique, correct sit<->stand t/f technique, correct gait mechanics, and progression of POC.                   Point: Precautions (Done)     Learning Progress Summary           Patient Acceptance, E,D, VU,NR by LR at 9/19/2019  1:16 PM    Comment:  Educated on precautions, weight bearing status, correct supine to sit t/f technique, correct sit<->stand t/f technique, correct gait mechanics, and progression of POC.   Significant Other Acceptance, E,D, VU,NR by LR at 9/19/2019  1:16 PM    Comment:  Educated on precautions, weight bearing status, correct supine to sit t/f technique, correct sit<->stand t/f technique, correct gait mechanics, and progression of POC.                               User Key     Initials Effective Dates Name Provider Type Discipline    LR 06/19/15 -  Renae Martinez, PT Physical Therapist PT              PT Recommendation and Plan  Planned Therapy Interventions (PT Eval): balance training, bed mobility training, gait training, home exercise program, stair training, ROM (range of motion), patient/family education, strengthening, transfer training  Outcome Summary/Treatment Plan (PT)  Anticipated Equipment Needs at Discharge (PT): (none)  Anticipated Discharge Disposition (PT): home with assist, home with home health  Plan of Care Reviewed With: patient, spouse  Progress: improving  Outcome Summary: Patient ambulated 350 feet with RW and step through gait pattern, limited by pain. ROM to be initiated POD#1. Plan is d/c home with family and HHPT. Will continue to progress as able. PADD score 9.      Time  Calculation:   PT Charges     Row Name 09/19/19 1316             Time Calculation    Start Time  1316  -LR      PT Received On  09/19/19  -LR      PT Goal Re-Cert Due Date  09/29/19  -LR         Time Calculation- PT    Total Timed Code Minutes- PT  10 minute(s)  -LR         Timed Charges    06081 - PT Therapeutic Exercise Minutes  4  -LR      30651 - Gait Training Minutes   6  -LR        User Key  (r) = Recorded By, (t) = Taken By, (c) = Cosigned By    Initials Name Provider Type    LR Renae Martinez, PT Physical Therapist        Therapy Charges for Today     Code Description Service Date Service Provider Modifiers Qty    62462261122 HC GAIT TRAINING EA 15 MIN 9/19/2019 Renae Martinez, PT GP 1    64025026391 HC PT THER SUPP EA 15 MIN 9/19/2019 Renae Maritnez, PT GP 2    39470757781 HC PT EVAL LOW COMPLEXITY 3 9/19/2019 Renae Martinez, PT GP 1          PT G-Codes  Outcome Measure Options: AM-PAC 6 Clicks Basic Mobility (PT)  AM-PAC 6 Clicks Score (PT): 19    Renae Martinez, PT  9/19/2019

## 2019-09-19 NOTE — OP NOTE
DATE OF PROCEDURE: 09/19/19    PREOPERATIVE DIAGNOSIS: right knee arthritis      POSTOPERATIVE DIAGNOSIS:  right knee arthritis    PROCEDURES PERFORMED:   right total knee arthroplasty with [employer] Legion components (#7 posterior stabilized femur, #7 tibia, 10 mm polyethylene, with 35 three peg patella).     SURGEON: José Luis Liao MD    ASSISTANT: Юлия Ontiveros PA-C     SPECIMENS: None    IMPLANTS:   Implants     Implant            Cmt Bone Simplex/P Full Dose 10/Pk - [id number] - Implanted   (Right) Patella    Inventory item: Cmt Bone Simplex/P Full Dose 10/Pk Model/Cat number: [id number]    : [employer] Lot number: [id number]    As of 9/19/2019     Status: Implanted                  Cmt Bone Simplex/P Full Dose 10/Pk - [id number] - Implanted   (Right) Patella    Inventory item: Cmt Bone Simplex/P Full Dose 10/Pk Model/Cat number: [id number]    : [employer] Lot number: [id number]    As of 9/19/2019     Status: Implanted                  Comp Fem/Kn Legion Oxinium Ps Sz7 Rt - [id number] - Implanted   (Right) Patella    Inventory item: Comp Fem/Kn Legion Oxinium Ps Sz7 Rt Model/Cat number: [id number]    : [employer] Lot number: [id number]    As of 9/19/2019     Status: Implanted                  Base Tib/Kn Gen2 Nonpor Ti Sz6 Rt - [id number] - Implanted   (Right) Patella    Inventory item: Base Tib/Kn Gen2 Nonpor Ti Sz6 Rt Model/Cat number: [id number]    : [employer] Lot number: [id number]    As of 9/19/2019     Status: Implanted                  Pat Gen2 Resrf 35mm - [id number] - Implanted   (Right) Patella    Inventory item: Pat Gen2 Resrf 35mm Model/Cat number: [id number]    : [employer] Lot number: [id number]    As of 9/19/2019     Status: Implanted                  Insrt Art Legion Ps Hf Xlpe Sz5to6 10mm - [id number] - Implanted   (Right) Patella    Inventory item: Insrt Art Legion Ps Hf Xlpe Sz5to6 10mm Model/Cat number: [id number]     : [employer] Lot number: [id number]    As of 9/19/2019     Status: Implanted                         ANESTHESIA:  Spinal    STAFF:  Circulator: Savanna August RN  Scrub Person: Jordan Almeida  Vendor Representative: Alirio Rosenberg  Nursing Assistant: Kanchan Loera  Assistant: Юлия Ontiveros PA-C    TOURNIQUET TIME: 14 minutes    ESTIMATED BLOOD LOSS: 100ml     COMPLICATIONS: None    PREOPERATIVE ANTIBIOTICS: Ancef 2 g    INDICATIONS: The patient is a 68 y.o. male with debilitating right knee pain secondary to osteoarthritis that failed to improve in spite of conservative treatment .  Options have been discussed at length with the patient and the patient has had an extended course of conservative treatment without long-term benefit. The patient has reached the point where the patient desires total knee arthroplasty surgery and understands the risks, benefits, and alternatives. Consent was obtained. Please see my office notes for details with regard to preoperative counseling and operative rationale.     DESCRIPTION OF PROCEDURE: The patient was positively identified in the preoperative holding area and brought to the operating suite and placed in a supine position. After adequate spinal anesthetic had been achieved, the right lower extremity was prepped and draped in the usual sterile fashion.  After application of a tourniquet to the right upper thigh, which was used during the procedure for a total 14 minutes during the cementation process only. Landmarks of the knee were identified and timeout procedure was performed to confirm the operative site, as well as other parameters. Following the sterile prep and drape, a skin incision was made just off the medial aspect of midline for a medial parapatellar approach. Following a sharp skin incision, dissection was carried down to the level of the extensor mechanism and a medial parapatellar arthrotomy was made and the patella was tucked into the  lateral gutter. Description of arthritis: Bone-on-bone contact the medial compartment, with patellofemoral degeneration.  The knee was adequately exposed and a distal femoral cut was made with an intramedullary guide. This was subsequently sized for a #7 implant and anterior, posterior chamfer cuts made. The menisci removed both medially and laterally.  The ACL was transected and the tibia was subluxed anteriorly. Proximal tibia cut was made with the external alignment guide. The cut was noted to be perpendicular to the tibial axis, with symmetric flexion and extension gaps. Therefore, final tibial preparations to accommodate a #7 tibia were made, followed by final femoral preparations for the box region. With a trial 10 insert in place, full flexion and extension was noted with no instability. The patella was then prepared for a 35 three peg patella which had excellent tracking. All trial components were removed and final components were cemented in place with namely a #7 tibia, #7 posterior stabilized femur and a 35 three peg patella with a trial 11 insert for cement compression. All the excess cement was removed from the bone implant interface and allowed to harden. Tourniquet was deflated. Hemostasis was obtained with electrocautery. There was no brisk bleeding noted in the popliteal fossa in particular. Therefore, the knee was copiously irrigated as it was between major steps, and the final 10 insert was placed as this was deemed appropriate for the patient's anatomy with full flexion and extension and no instability and attention was then directed towards closure. The medial parapatellar arthrotomy was closed with #1 Vicryl in an interrupted figure-of-eight fashion in 4 strategic locations followed by oversewing this from proximal to distal with a #1 StrataFix symmetric, which nicely sealed the joint, followed by closure of the deep fascial layer with #1 Vicryl in a buried interrupted fashion, followed by  closure of the subcutaneous layer with 2-0 Vicryl and the skin with 3-0 Stratafix in a running subcuticular fashion. Prineo wound closure dressing was applied followed by a sterile dressing with 4 x 4's, abdominal pad, soft roll and Ace wrap. The patient tolerated the procedure well and was brought to the recovery room in good condition.     PLAN:  1.  The patient will begin early range of motion and weight-bearing per the post total knee arthroplasty protocol.   2.  I anticipate brief hospitalization for initial rehabilitation and pain control followed by continued rehabilitation in either home health or supervised setting.   3.  Postoperative medical management with Dr. Weldon.  4.  Aspirin will be utilized for DVT prophylaxis unless there is a contraindication.       José Luis Liao MD  09/19/19  9:11 AM

## 2019-09-19 NOTE — ANESTHESIA PROCEDURE NOTES
Spinal Block      Patient reassessed immediately prior to procedure    Patient location during procedure: OR  Indication:at surgeon's request  Performed By  CRNA: Mir Pathak CRNA  Preanesthetic Checklist  Completed: patient identified, site marked, surgical consent, pre-op evaluation, timeout performed, IV checked, risks and benefits discussed and monitors and equipment checked  Spinal Block Prep:  Patient Position:sitting  Sterile Tech:cap, gloves, sterile barriers and mask  Prep:Chloraprep  Patient Monitoring:blood pressure monitoring, continuous pulse oximetry and EKG  Spinal Block Procedure  Approach:midline  Guidance:landmark technique and palpation technique  Location:L4-L5  Needle Type:Sprotte  Needle Gauge:25 G  Placement of Spinal needle event:cerebrospinal fluid aspirated  Paresthesia: no  Fluid Appearance:clear  Medications: bupivacaine (MARCAINE) 0.5 % injection, 2 mL   Post Assessment  Patient Tolerance:patient tolerated the procedure well with no apparent complications  Complications no  Additional Notes  Procedure:  Pt assisted to sitting position, with legs in position of comfort over side of bed.  Pt. instructed in optimal spine presentation, the spine was prepped/ Draped and the skin at insertion site was anesthetized with 1% Lidocaine 2 ml.  The spinal needle was then advanced until CSF flow was obtained and LA was injected:

## 2019-09-19 NOTE — ANESTHESIA PREPROCEDURE EVALUATION
Anesthesia Evaluation                  Airway   Mallampati: II  Dental      Pulmonary    Cardiovascular     (+) hypertension,       Neuro/Psych  GI/Hepatic/Renal/Endo    (+)   hypothyroidism,     Musculoskeletal     Abdominal    Substance History      OB/GYN          Other                        Anesthesia Plan    ASA 3     spinal and regional     intravenous induction   Anesthetic plan, all risks, benefits, and alternatives have been provided, discussed and informed consent has been obtained with: patient.

## 2019-09-19 NOTE — H&P
Patient Name: Mir Stokes  MRN: [id number]  : 1950  DOS: 2019    Attending: José Luis Liao MD    Primary Care Provider: Ye Soto MD      Chief complaint:  Right knee pain    Subjective   Patient is a 68 y.o. male presented for right knee arthroplasty by Dr. Liao.    He underwent surgery under GA. He tolerated surgery well and is admitted for further medical management. His knee has been severely painful since February. He denies injury or recent falls. He denies use of assistive device for ambulation.    When seen postop he is doing well. His pain is well controlled. He denies nausea, shortness of breath or chest pain. No hx of DVT or PE.     ( Above is noted/ agree. Seen in his room afterwards. Doing ok. Some pain especially posterior knee. No f/c/n/vom/sob. Walked with PT, 350 ft with RW and step through gait pattern. )wy    Allergies:  No Known Allergies    Meds:  Medications Prior to Admission   Medication Sig Dispense Refill Last Dose   • aspirin 81 MG EC tablet Take 81 mg by mouth Daily.   2019   • CBD (cannabidiol) oral oil Take 1 drop by mouth Daily.   2019 at Unknown time   • ferrous sulfate 324 (65 Fe) MG tablet delayed-release EC tablet Take 324 mg by mouth Daily With Breakfast.   2019 at Unknown time   • lisinopril-hydrochlorothiazide (PRINZIDE,ZESTORETIC) 20-12.5 MG per tablet Take 1 tablet by mouth Daily.   2019 at Unknown time   • meloxicam (MOBIC) 15 MG tablet Take 15 mg by mouth Daily.   2019 at Unknown time   • simvastatin (ZOCOR) 10 MG tablet Take 10 mg by mouth Every Night.   2019 at Unknown time   • SYNTHROID 125 MCG tablet Take 125 mcg by mouth Daily.   2019 at Unknown time       History:   Past Medical History:   Diagnosis Date   • Arthritis    • Hypertension    • Hypothyroid    • Wears partial dentures     single lower tooth      Past Surgical History:   Procedure Laterality Date   • CATARACT EXTRACTION Bilateral  "2017   • COLONOSCOPY     • KNEE ARTHROPLASTY Left 2014     Family History   Problem Relation Age of Onset   • Hypertension Mother    • Heart attack Mother    • Hypertension Father    • Heart attack Father      Social History     Tobacco Use   • Smoking status: Former Smoker     Last attempt to quit: 1985     Years since quittin.7   • Smokeless tobacco: Former User     Types: Chew   • Tobacco comment: occasional cigar  but stopped 30 yr ago quit chew tobacco 5 yr ago    Substance Use Topics   • Alcohol use: Yes     Alcohol/week: 1.2 oz     Types: 2 Shots of liquor per week     Comment: occasional   • Drug use: No   He is  with 2 children. He is retired from construction.    Review of Systems  Pertinent items are noted in HPI, all other systems reviewed and negative    Vital Signs  Visit Vitals  /94 (BP Location: Right arm, Patient Position: Lying)   Pulse 59   Temp 97.6 °F (36.4 °C) (Oral)   Resp 18   Ht 180.3 cm (71\")   Wt 103 kg (226 lb)   SpO2 100%   BMI 31.52 kg/m²       Physical Exam:    General Appearance:    Alert, cooperative, in no acute distress   Head:    Normocephalic, without obvious abnormality, atraumatic   Eyes:            Lids and lashes normal, conjunctivae and sclerae normal, no   icterus, no pallor, corneas clear   Ears:    Ears appear intact with no abnormalities noted   Neck:   No adenopathy, supple, trachea midline, no thyromegaly   Lungs:     Clear to auscultation,respirations regular, even and                   unlabored    Heart:    Regular rhythm and normal rate, normal S1 and S2, no            murmur, no gallop   Abdomen:     Normal bowel sounds, no masses, no organomegaly, soft        non-tender, non-distended, no guarding, no rebound                 tenderness   Genitalia:    Deferred   Extremities:   Right knee ACE wrap CDI. Nerve block present   Pulses:   Pulses palpable and equal bilaterally   Skin:   No bleeding, bruising or rash   Neurologic:   Cranial nerves 2 - " 12 grossly intact, sensation intact. Flexion and dorsiflexion intact bilateral feet.        I reviewed the patient's new clinical results.     Results for KAREN BAKER (MRN [id number]) as of 9/19/2019 11:08   Ref. Range 9/5/2019 11:39   Glucose Latest Ref Range: 65 - 99 mg/dL 78   Sodium Latest Ref Range: 136 - 145 mmol/L 141   Potassium Latest Ref Range: 3.5 - 5.2 mmol/L 4.4   CO2 Latest Ref Range: 22.0 - 29.0 mmol/L 27.0   Chloride Latest Ref Range: 98 - 107 mmol/L 101   Anion Gap Latest Ref Range: 5.0 - 15.0 mmol/L 13.0   Creatinine Latest Ref Range: 0.76 - 1.27 mg/dL 1.27   BUN Latest Ref Range: 8 - 23 mg/dL 27 (H)   BUN/Creatinine Ratio Latest Ref Range: 7.0 - 25.0  21.3   Calcium Latest Ref Range: 8.6 - 10.5 mg/dL 9.6   eGFR Non  Am Latest Ref Range: >60 mL/min/1.73 56 (L)   Hemoglobin A1C Latest Ref Range: 4.80 - 5.60 % 5.10   C-Reactive Protein Latest Ref Range: 0.00 - 0.50 mg/dL 0.39   Protime Latest Ref Range: 11.2 - 14.3 Seconds 13.8   INR Latest Ref Range: 0.85 - 1.16  1.11   PTT Latest Ref Range: 24.0 - 37.0 seconds 32.3   WBC Latest Ref Range: 3.40 - 10.80 10*3/mm3 6.43   RBC Latest Ref Range: 4.14 - 5.80 10*6/mm3 4.41   Hemoglobin Latest Ref Range: 13.0 - 17.7 g/dL 14.8   Hematocrit Latest Ref Range: 37.5 - 51.0 % 43.8   RDW Latest Ref Range: 12.3 - 15.4 % 13.0   MCV Latest Ref Range: 79.0 - 97.0 fL 99.3 (H)   MCH Latest Ref Range: 26.6 - 33.0 pg 33.6 (H)   MCHC Latest Ref Range: 31.5 - 35.7 g/dL 33.8   MPV Latest Ref Range: 6.0 - 12.0 fL 12.9 (H)   Platelets Latest Ref Range: 140 - 450 10*3/mm3 135 (L)     Assessment and Plan:     Status post total right knee replacement    Primary osteoarthritis of right knee    HTN (hypertension)    HLD (hyperlipidemia)    Hypothyroid      Plan  1. PT/OT- early ambulation postop  2. Pain control-prns, ( changed to Percocet with good results)wy  AC nerve block  3. IS-encourage  4. DVT proph- mechs/ASA  5. Bowel regimen  6. Resume home medications  as appropriate  7. Monitor post-op labs  8. Discharge planning for home     HTN, HLD  - Continue home lisinopril and statin  - Hold HCTZ  - Monitor BP   - Holding parameters for BP meds  - Labetalol PRN for SBP>170    Hypothyroid  - Continue home synthroid      MARQUEZ Sanchez  09/19/19  12:17 PM   I have personally performed the evaluation on this patient. My history is consistent  with HPI obtained. My exam findings are listed above. I have personally reviewed and discussed the above formulated treatment plan with pt, RN and CATA.Al

## 2019-09-19 NOTE — ANESTHESIA PROCEDURE NOTES
Acb cath (right)      Patient reassessed immediately prior to procedure    Patient location during procedure: post-op  Reason for block: at surgeon's request and post-op pain management  Performed by  CRNA: Jovany Nagel CRNA  Assisted by: Alisha Justice RN  Preanesthetic Checklist  Completed: patient identified, site marked, surgical consent, pre-op evaluation, timeout performed, IV checked, risks and benefits discussed and monitors and equipment checked  Prep:  Pt Position: supine  Sterile barriers:cap, gloves, mask and sterile barriers  Prep: ChloraPrep  Patient monitoring: blood pressure monitoring, continuous pulse oximetry and EKG  Procedure  Performed under: spinal  Guidance:ultrasound guided  Images:still images obtained, printed/placed on chart    Laterality:right  Block Type:adductor canal block  Injection Technique:catheter  Needle Type:Tuohy and echogenic  Needle Gauge:18 G  Resistance on Injection: none  Catheter Size:20 G (20g)  Cath Depth at skin: 10 cm    Medications Used: bupivacaine PF (MARCAINE) 0.25 % injection, 25 mL      Post Assessment  Injection Assessment: negative aspiration for heme, incremental injection and no paresthesia on injection  Patient Tolerance:comfortable throughout block  Complications:no  Additional Notes  Procedure:             The pt was placed in the Supine position.  The Insertion site was  prepped and Draped in sterile fashion.  The pt was anesthetized with  IV Sedation( see meds).  Skin and cutaneous tissue was infiltrated and anesthetized with 1% Lidocaine 3 mls via a 25g needle.  A BBraun 4 inch 18g echogenic needle was then  inserted approximately midline, mid-thigh and advanced In-plane with Ultrasound guidance.  Normal Saline PSF was utilized for hydrodissection of tissue.  The Vastus medialis and Sartorius muscle where visualized and the needle tip was placed in the adductor canal,  lateral to the femoral artery.  LA injection spread was visualized,  injection was incremental 1-5ml, injection pressure was normal or little, no intraneural injection, no vascular injection.  LA dose was injected thru the needle(see dose above).  A BBraun 20g wire stylet catheter was placed via the needle with ultrasound visualization and confirmation with NS fluid bolus. The labeled Catheter was then secured to skin at insertion site with skin afix and steristrips to curled catheter and CHG transparent dressing.  Thank you.

## 2019-09-20 VITALS
RESPIRATION RATE: 16 BRPM | DIASTOLIC BLOOD PRESSURE: 70 MMHG | BODY MASS INDEX: 31.64 KG/M2 | OXYGEN SATURATION: 93 % | WEIGHT: 226 LBS | SYSTOLIC BLOOD PRESSURE: 125 MMHG | HEIGHT: 71 IN | TEMPERATURE: 98.6 F | HEART RATE: 87 BPM

## 2019-09-20 PROBLEM — D62 ACUTE BLOOD LOSS ANEMIA: Status: ACTIVE | Noted: 2019-09-20

## 2019-09-20 PROBLEM — G89.18 ACUTE POSTOPERATIVE PAIN: Status: ACTIVE | Noted: 2019-09-20

## 2019-09-20 PROBLEM — D69.6 THROMBOCYTOPENIA (HCC): Status: ACTIVE | Noted: 2019-09-20

## 2019-09-20 LAB
ANION GAP SERPL CALCULATED.3IONS-SCNC: 8 MMOL/L (ref 5–15)
BUN BLD-MCNC: 17 MG/DL (ref 8–23)
BUN/CREAT SERPL: 14.9 (ref 7–25)
CALCIUM SPEC-SCNC: 8.5 MG/DL (ref 8.6–10.5)
CHLORIDE SERPL-SCNC: 104 MMOL/L (ref 98–107)
CO2 SERPL-SCNC: 28 MMOL/L (ref 22–29)
CREAT BLD-MCNC: 1.14 MG/DL (ref 0.76–1.27)
DEPRECATED RDW RBC AUTO: 48.5 FL (ref 37–54)
ERYTHROCYTE [DISTWIDTH] IN BLOOD BY AUTOMATED COUNT: 13.1 % (ref 12.3–15.4)
GFR SERPL CREATININE-BSD FRML MDRD: 64 ML/MIN/1.73
GLUCOSE BLD-MCNC: 116 MG/DL (ref 65–99)
HCT VFR BLD AUTO: 32.5 % (ref 37.5–51)
HGB BLD-MCNC: 10.8 G/DL (ref 13–17.7)
MCH RBC QN AUTO: 33.8 PG (ref 26.6–33)
MCHC RBC AUTO-ENTMCNC: 33.2 G/DL (ref 31.5–35.7)
MCV RBC AUTO: 101.6 FL (ref 79–97)
PLATELET # BLD AUTO: 94 10*3/MM3 (ref 140–450)
PMV BLD AUTO: 13.4 FL (ref 6–12)
POTASSIUM BLD-SCNC: 4.1 MMOL/L (ref 3.5–5.2)
RBC # BLD AUTO: 3.2 10*6/MM3 (ref 4.14–5.8)
SODIUM BLD-SCNC: 140 MMOL/L (ref 136–145)
WBC NRBC COR # BLD: 9.57 10*3/MM3 (ref 3.4–10.8)

## 2019-09-20 PROCEDURE — 63710000001 OXYCODONE-ACETAMINOPHEN 5-325 MG TABLET: Performed by: INTERNAL MEDICINE

## 2019-09-20 PROCEDURE — 97116 GAIT TRAINING THERAPY: CPT

## 2019-09-20 PROCEDURE — 63710000001 LEVOTHYROXINE 125 MCG TABLET: Performed by: NURSE PRACTITIONER

## 2019-09-20 PROCEDURE — A9270 NON-COVERED ITEM OR SERVICE: HCPCS | Performed by: NURSE PRACTITIONER

## 2019-09-20 PROCEDURE — 63710000001 ASPIRIN EC 325 MG TABLET DELAYED-RELEASE: Performed by: ORTHOPAEDIC SURGERY

## 2019-09-20 PROCEDURE — A9270 NON-COVERED ITEM OR SERVICE: HCPCS | Performed by: ORTHOPAEDIC SURGERY

## 2019-09-20 PROCEDURE — 99024 POSTOP FOLLOW-UP VISIT: CPT | Performed by: ORTHOPAEDIC SURGERY

## 2019-09-20 PROCEDURE — 85027 COMPLETE CBC AUTOMATED: CPT | Performed by: ORTHOPAEDIC SURGERY

## 2019-09-20 PROCEDURE — 97165 OT EVAL LOW COMPLEX 30 MIN: CPT | Performed by: OCCUPATIONAL THERAPIST

## 2019-09-20 PROCEDURE — 25010000002 CEFAZOLIN PER 500 MG: Performed by: ORTHOPAEDIC SURGERY

## 2019-09-20 PROCEDURE — 97535 SELF CARE MNGMENT TRAINING: CPT | Performed by: OCCUPATIONAL THERAPIST

## 2019-09-20 PROCEDURE — A9270 NON-COVERED ITEM OR SERVICE: HCPCS | Performed by: INTERNAL MEDICINE

## 2019-09-20 PROCEDURE — 63710000001 ACETAMINOPHEN 325 MG TABLET: Performed by: ORTHOPAEDIC SURGERY

## 2019-09-20 PROCEDURE — 80048 BASIC METABOLIC PNL TOTAL CA: CPT | Performed by: NURSE PRACTITIONER

## 2019-09-20 PROCEDURE — 63710000001 MELOXICAM 7.5 MG TABLET: Performed by: ORTHOPAEDIC SURGERY

## 2019-09-20 PROCEDURE — 63710000001 LISINOPRIL 20 MG TABLET: Performed by: NURSE PRACTITIONER

## 2019-09-20 PROCEDURE — 97110 THERAPEUTIC EXERCISES: CPT

## 2019-09-20 PROCEDURE — 63710000001 ATORVASTATIN 10 MG TABLET: Performed by: NURSE PRACTITIONER

## 2019-09-20 RX ORDER — ASPIRIN 81 MG/1
81 TABLET ORAL DAILY
Start: 2019-09-20

## 2019-09-20 RX ORDER — OXYCODONE HYDROCHLORIDE AND ACETAMINOPHEN 5; 325 MG/1; MG/1
1 TABLET ORAL EVERY 4 HOURS PRN
Qty: 40 TABLET | Refills: 0 | Status: SHIPPED | OUTPATIENT
Start: 2019-09-20 | End: 2019-09-29

## 2019-09-20 RX ORDER — DOCUSATE SODIUM 100 MG/1
100 CAPSULE, LIQUID FILLED ORAL 2 TIMES DAILY PRN
Qty: 60 CAPSULE | Refills: 0 | Status: SHIPPED | OUTPATIENT
Start: 2019-09-20

## 2019-09-20 RX ORDER — MELOXICAM 15 MG/1
15 TABLET ORAL DAILY
Start: 2019-09-20 | End: 2019-11-13

## 2019-09-20 RX ADMIN — ACETAMINOPHEN 650 MG: 325 TABLET ORAL at 03:23

## 2019-09-20 RX ADMIN — LISINOPRIL 20 MG: 20 TABLET ORAL at 08:26

## 2019-09-20 RX ADMIN — OXYCODONE HYDROCHLORIDE AND ACETAMINOPHEN 1 TABLET: 5; 325 TABLET ORAL at 08:25

## 2019-09-20 RX ADMIN — CEFAZOLIN 2 G: 10 INJECTION, POWDER, FOR SOLUTION INTRAVENOUS; PARENTERAL at 03:19

## 2019-09-20 RX ADMIN — MELOXICAM 15 MG: 7.5 TABLET ORAL at 08:25

## 2019-09-20 RX ADMIN — OXYCODONE HYDROCHLORIDE AND ACETAMINOPHEN 1 TABLET: 5; 325 TABLET ORAL at 03:43

## 2019-09-20 RX ADMIN — ASPIRIN 325 MG: 325 TABLET, DELAYED RELEASE ORAL at 08:26

## 2019-09-20 RX ADMIN — ATORVASTATIN CALCIUM 10 MG: 10 TABLET, FILM COATED ORAL at 08:26

## 2019-09-20 RX ADMIN — LEVOTHYROXINE SODIUM 125 MCG: 125 TABLET ORAL at 08:26

## 2019-09-20 NOTE — NURSING NOTE
Acute Pain Service:  Peripheral nerve catheter and disposable infusion device teaching completed with patient. Discussion, handout and bracelet provided with [facility] on call central phone number.  Instructed to call with any questions or concerns.  Patient verbalized understanding.  Service will continue to follow until catheter DC'd.  Please contact patient at [phone] if needed.     Dressing CDI. Infusion at 10 ml/hr

## 2019-09-20 NOTE — DISCHARGE SUMMARY
Patient Name: Mir Stokes  MRN: [id number]  : 1950  DOS: 2019    Attending: No att. providers found    Primary Care Provider: Ye Soto MD    Date of Admission:.2019  6:04 AM    Date of Discharge:  2019    Discharge Diagnosis:   Status post total right knee replacement    Primary osteoarthritis of right knee    HTN (hypertension)    HLD (hyperlipidemia)    Hypothyroid    Acute blood loss anemia, mild, asymptomatic    Acute postoperative pain    Thrombocytopenia (CMS/HCC)      Hospital Course  Patient is a 68 y.o. male presented for right knee arthroplasty by Dr. Liao.     He underwent surgery under GA. He tolerated surgery well and was admitted for further medical management. His knee has been severely painful since February. He denies injury or recent falls. He denies use of assistive device for ambulation.      Patient was provided pain medications as needed for pain control, along with adductor canal nerve block infusion of Ropivacaine.    Adjustments were made to pain medications to optimize postop pain management. Risks and benefits of opiate medications discussed with patient.    He was seen by PT and OT and has progressed well over his stay.  He used an IS for atelectasis prophylaxis and aspirin along with mechanicals for DVT prophylaxis.  Home medications were resumed as appropriate, and labs were monitored and remained fairly stable.     With the progress he has made, he is ready for DC home today.    He will have an Arrow pump ( instructed on it during this admit).  Discussed with patient regarding plan and he shows understanding and agreement.    Patient will have HHPT following discharge.      Procedures Performed  DATE OF PROCEDURE: 19     PREOPERATIVE DIAGNOSIS: right knee arthritis       POSTOPERATIVE DIAGNOSIS:  right knee arthritis     PROCEDURES PERFORMED:   right total knee arthroplasty with [employer] components (#7 posterior  "stabilized femur, #7 tibia, 10 mm polyethylene, with 35 three peg patella).      SURGEON: José Luis Liao MD    Pertinent Test Results:    I reviewed the patient's new clinical results.   Results from last 7 days   Lab Units 19  0733   WBC 10*3/mm3 9.57   HEMOGLOBIN g/dL 10.8*   HEMATOCRIT % 32.5*   PLATELETS 10*3/mm3 94*     Results for KAREN BAKER (MRN [id number]) as of 2019 14:17   Ref. Range 2019 11:39   Hemoglobin Latest Ref Range: 13.0 - 17.7 g/dL 14.8   Hematocrit Latest Ref Range: 37.5 - 51.0 % 43.8     Results from last 7 days   Lab Units 19  0733   SODIUM mmol/L 140   POTASSIUM mmol/L 4.1   CHLORIDE mmol/L 104   CO2 mmol/L 28.0   BUN mg/dL 17   CREATININE mg/dL 1.14   CALCIUM mg/dL 8.5*   GLUCOSE mg/dL 116*     I reviewed the patient's new imaging including images and reports.      Physical therapy: Pt increased gait distance to 600 feet with supervision and RW. Pt progressed to stair training and ROM measured 17-79 degrees. Pt is discharging home today with HHPT, made good progress toward goals during inpatient stay    Discharge Assessment:    Vital Signs  Visit Vitals  /70 (BP Location: Left arm, Patient Position: Lying)   Pulse 87   Temp 98.6 °F (37 °C) (Oral)   Resp 16   Ht 180.3 cm (71\")   Wt 103 kg (226 lb)   SpO2 93%   BMI 31.52 kg/m²     Temp (24hrs), Av °F (37.2 °C), Min:97 °F (36.1 °C), Max:101.1 °F (38.4 °C)      General Appearance:    Alert, cooperative, in no acute distress   Lungs:     Clear to auscultation,respirations regular, even and                   unlabored    Heart:    Regular rhythm and normal rate, normal S1 and S2   Abdomen:     Normal bowel sounds, no masses, no organomegaly, soft        non-tender, non-distended, no guarding, no rebound                 tenderness   Extremities:   Moves all extremities well, no edema, no cyanosis, no              Redness. Right knee ACE wrap CDI. Nerve block present   Pulses:   Pulses palpable and " equal bilaterally   Skin:   No bleeding, bruising or rash   Neurologic:   Cranial nerves 2 - 12 grossly intact, sensation intact. Flexion and dorsiflexion intact bilateral feet.       Discharge Disposition: Home    Discharge Medications     Discharge Medications      New Medications      Instructions Start Date   docusate sodium 100 MG capsule  Commonly known as:  COLACE   100 mg, Oral, 2 Times Daily PRN      oxyCODONE-acetaminophen 5-325 MG per tablet  Commonly known as:  PERCOCET   1 tablet, Oral, Every 4 Hours PRN         Changes to Medications      Instructions Start Date   aspirin 81 MG EC tablet  What changed:  additional instructions   81 mg, Oral, Daily, Resume in 1 month      aspirin 325 MG EC tablet  What changed:  You were already taking a medication with the same name, and this prescription was added. Make sure you understand how and when to take each.   325 mg, Oral, Daily, For 1 month   Start Date:  9/21/2019     meloxicam 15 MG tablet  Commonly known as:  MOBIC  What changed:  additional instructions   15 mg, Oral, Daily, Must take an hour after aspirin if needed.         Continue These Medications      Instructions Start Date   CBD oral oil  Commonly known as:  cannabidiol   1 drop, Oral, Daily      ferrous sulfate 324 (65 Fe) MG tablet delayed-release EC tablet   324 mg, Oral, Daily With Breakfast      lisinopril-hydrochlorothiazide 20-12.5 MG per tablet  Commonly known as:  PRINZIDE,ZESTORETIC   1 tablet, Oral, Daily      simvastatin 10 MG tablet  Commonly known as:  ZOCOR   10 mg, Oral, Nightly      SYNTHROID 125 MCG tablet  Generic drug:  levothyroxine   125 mcg, Oral, Daily             Discharge Diet: Regular diet    Activity at Discharge: WBAT RLE    Follow-up Appointments  Dr. Liao per his orders      MARQUEZ Sanchez  09/20/19  2:18 PM

## 2019-09-20 NOTE — PLAN OF CARE
Problem: Patient Care Overview  Goal: Plan of Care Review  Outcome: Outcome(s) achieved Date Met: 09/20/19 09/20/19 0712   OTHER   Outcome Summary Pt s/p TKA and now with improved pain and symtpoms. Pt demonstrates good overall safety and understanding of transfer technique, rwx safety, AE use and ADL retraining/compensatory strategies to improve independence and comfort. Plan for d/c home with adequate home support and all needed DME and AE.   Coping/Psychosocial   Plan of Care Reviewed With patient       Problem: Knee Arthroplasty (Total, Partial) (Adult)  Goal: Signs and Symptoms of Listed Potential Problems Will be Absent, Minimized or Managed (Knee Arthroplasty)  Outcome: Outcome(s) achieved Date Met: 09/20/19 09/20/19 0712   Goal/Outcome Evaluation   Problems Assessed (Knee Arthroplasty) functional deficit   Problems Present (Knee Arthroplasty) functional deficit

## 2019-09-20 NOTE — PLAN OF CARE
Problem: Patient Care Overview  Goal: Plan of Care Review  Outcome: Ongoing (interventions implemented as appropriate)   09/20/19 0829   Plan of Care Review   Progress improving   OTHER   Outcome Summary Pt increased gait distance to 600 feet with supervision and RW. Pt progressed to stair training and ROM measured 17-79 degrees. Pt is discharging home today with HHPT, made good progress toward goals during inpatient stay   Coping/Psychosocial   Plan of Care Reviewed With patient       Problem: Knee Arthroplasty (Total, Partial) (Adult)  Goal: Signs and Symptoms of Listed Potential Problems Will be Absent, Minimized or Managed (Knee Arthroplasty)  Outcome: Ongoing (interventions implemented as appropriate)   09/20/19 0829   Goal/Outcome Evaluation   Problems Assessed (Knee Arthroplasty) functional deficit;pain;range of motion decreased   Problems Present (Knee Arthroplasty) functional deficit;pain;range of motion decreased

## 2019-09-20 NOTE — PROGRESS NOTES
[facility]    Acute pain service Inpatient Progress Note    Patient Name: Mir Stokes  :  1950  MRN:  [id number]        Acute Pain  Service Inpatient Progress Note:    Analgesia:Good  Pain Score:1/10  LOC: alert and awake  Resp Status: room air  Cardiac: VS stable  Side Effects:None  Catheter Site:clean, dressing intact and dry  Cath type: peripheral nerve cath with ON Q  Infusion rate: 10ml/hr  Catheter Plan:Catheter to remain Insitu and Continue catheter infusion rate unchanged

## 2019-09-20 NOTE — DISCHARGE PLACEMENT REQUEST
Mir Stokes (68 y.o. Male)     JULY Bernard Case Manager, [phone]      [address]  [address] [facility] [address]  Phone:  [phone]  Fax:   Date: Sep 20, 2019      Ambulatory Referral to Home Health     Patient:  Mir Stokes MRN:  [id number]   [address] [id number] :  1950  SSN:    Phone: [phone] Sex:  M      INSURANCE PAYOR PLAN GROUP # SUBSCRIBER ID   Primary:  Secondary:    [facility] CROSS  MEDICARE [id number]  [id number]    [id number]  [id number]      Referring Provider Information:  FEMI KIRAN Phone: [phone] Fax:       Referral Information:   # Visits:  1 Referral Type: Home Health [42]   Urgency:  Routine Referral Reason: Specialty Services Required   Start Date: Sep 20, 2019 End Date:  To be determined by Insurer   Diagnosis: Impaired mobility and ADLs (Z74.09 [ICD-10-CM] 799.89 [ICD-9-CM])  Status post total right knee replacement (Z96.651 [ICD-10-CM] V43.65 [ICD-9-CM])      Refer to Dept:   Refer to Provider:   Refer to Facility:  [facility]     Face to Face Visit Date: 2019  Follow-up provider for Plan of Care? I will be treating the patient on an ongoing basis.  Please send me the Plan of Care for signature.  Follow-up provider: FEMI KIRAN [[id number]]  Reason/Clinical Findings: s/p right knee rep  Describe mobility limitations that make leaving home difficult: impaired functional mobility, balance, gait, and endurance  Nursing/Therapeutic Services Requested: Physical Therapy  PT orders: Total joint pathway     This document serves as a request of services and does not constitute Insurance authorization or approval of services.  To determine eligibility, please contact the members Insurance carrier to verify and review coverage.     If you have medical questions regarding this request for services. Please contact [address] at [phone] between the hours  "of 8:00am - 5:00pm (Mon-Fri).       Verbal Order Mode: Per protocol: cosign required  Authorizing Provider: José Luis Liao MD  Authorizing Provider's NPI: [id number]     Order Entered By: Joana Lee RN 9/20/2019 10:46 AM     Electronically signed by: José Luis Liao MD 9/20/2019 12:20 PM                Date of Birth Social Security Number Address Home Phone MRN    1950  [address] [place] [address] [phone] [id number]    Oriental orthodox Marital Status          None        Admission Date Admission Type Admitting Provider Attending Provider Department, Room/Bed    9/19/19 Elective José Luis Liao MD  [address], S382/1    Discharge Date Discharge Disposition Discharge Destination        9/20/2019 Home or Self Care              Attending Provider:  (none)   Allergies:  No Known Allergies    Isolation:  None   Infection:  None   Code Status:  CPR    Ht:  180.3 cm (71\")   Wt:  103 kg (226 lb)    Admission Cmt:  None   Principal Problem:  Status post total right knee replacement [Z96.651]                 Active Insurance as of 9/19/2019     Primary Coverage     Payor Plan Insurance Group Employer/Plan Group    [employer]HEM BLUE CROSS ANTHEM BLUE CROSS BLUE SHIELD PPO [id number]     Payor Plan Address Payor Plan Phone Number Payor Plan Fax Number Effective Dates    [address] [phone]  1/1/2019 - None Entered    [facility] [id number]       Subscriber Name Subscriber Birth Date Member ID       REYES BAKER 1/13/1959 [id number]           Secondary Coverage     Payor Plan Insurance Group Employer/Plan Group    MEDICARE MEDICARE A & B      Payor Plan Address Payor Plan Phone Number Payor Plan Fax Number Effective Dates    [address] [phone]  9/1/2015 - None Entered    [facility] [address]       Subscriber Name Subscriber Birth Date Member ID       KAREN BAKER 1950 [id number]                 Emergency Contacts      (Rel.) Home Phone " Work Phone Mobile Phone    Eunice Stokes (Spouse) [phone] -- [phone]            Insurance Information                ANTH BLUE CROSS/ANTHEM BLUE CROSS BLUE SHIELD PPO Phone: [phone]    Subscriber: Caitie Stokes Subscriber#: [id number]    Group#: [id number] Precert#:         MEDICARE/MEDICARE A & B Phone: [phone]    Subscriber: Mir Stokes Subscriber#: [id number]    Group#:  Precert#:              History & Physical      Romel Weldon MD at 19 1302          Patient Name: Mir Stokes  MRN: [id number]  : 1950  DOS: 2019    Attending: José Luis Liao MD    Primary Care Provider: Ye Soto MD      Chief complaint:  Right knee pain    Subjective   Patient is a 68 y.o. male presented for right knee arthroplasty by Dr. Liao.    He underwent surgery under GA. He tolerated surgery well and is admitted for further medical management. His knee has been severely painful since February. He denies injury or recent falls. He denies use of assistive device for ambulation.    When seen postop he is doing well. His pain is well controlled. He denies nausea, shortness of breath or chest pain. No hx of DVT or PE.     ( Above is noted/ agree. Seen in his room afterwards. Doing ok. Some pain especially posterior knee. No f/c/n/vom/sob. Walked with PT, 350 ft with RW and step through gait pattern. )wy    Allergies:  No Known Allergies    Meds:  Medications Prior to Admission   Medication Sig Dispense Refill Last Dose   • aspirin 81 MG EC tablet Take 81 mg by mouth Daily.   2019   • CBD (cannabidiol) oral oil Take 1 drop by mouth Daily.   2019 at Unknown time   • ferrous sulfate 324 (65 Fe) MG tablet delayed-release EC tablet Take 324 mg by mouth Daily With Breakfast.   2019 at Unknown time   • lisinopril-hydrochlorothiazide (PRINZIDE,ZESTORETIC) 20-12.5 MG per tablet Take 1 tablet by mouth Daily.   2019 at Unknown  "time   • meloxicam (MOBIC) 15 MG tablet Take 15 mg by mouth Daily.   2019 at Unknown time   • simvastatin (ZOCOR) 10 MG tablet Take 10 mg by mouth Every Night.   2019 at Unknown time   • SYNTHROID 125 MCG tablet Take 125 mcg by mouth Daily.   2019 at Unknown time       History:   Past Medical History:   Diagnosis Date   • Arthritis    • Hypertension    • Hypothyroid    • Wears partial dentures     single lower tooth      Past Surgical History:   Procedure Laterality Date   • CATARACT EXTRACTION Bilateral    • COLONOSCOPY     • KNEE ARTHROPLASTY Left      Family History   Problem Relation Age of Onset   • Hypertension Mother    • Heart attack Mother    • Hypertension Father    • Heart attack Father      Social History     Tobacco Use   • Smoking status: Former Smoker     Last attempt to quit:      Years since quittin.7   • Smokeless tobacco: Former User     Types: Chew   • Tobacco comment: occasional cigar  but stopped 30 yr ago quit chew tobacco 5 yr ago    Substance Use Topics   • Alcohol use: Yes     Alcohol/week: 1.2 oz     Types: 2 Shots of liquor per week     Comment: occasional   • Drug use: No   He is  with 2 children. He is retired from construction.    Review of Systems  Pertinent items are noted in HPI, all other systems reviewed and negative    Vital Signs  Visit Vitals  /94 (BP Location: Right arm, Patient Position: Lying)   Pulse 59   Temp 97.6 °F (36.4 °C) (Oral)   Resp 18   Ht 180.3 cm (71\")   Wt 103 kg (226 lb)   SpO2 100%   BMI 31.52 kg/m²       Physical Exam:    General Appearance:    Alert, cooperative, in no acute distress   Head:    Normocephalic, without obvious abnormality, atraumatic   Eyes:            Lids and lashes normal, conjunctivae and sclerae normal, no   icterus, no pallor, corneas clear   Ears:    Ears appear intact with no abnormalities noted   Neck:   No adenopathy, supple, trachea midline, no thyromegaly   Lungs:     Clear to " auscultation,respirations regular, even and                   unlabored    Heart:    Regular rhythm and normal rate, normal S1 and S2, no            murmur, no gallop   Abdomen:     Normal bowel sounds, no masses, no organomegaly, soft        non-tender, non-distended, no guarding, no rebound                 tenderness   Genitalia:    Deferred   Extremities:   Right knee ACE wrap CDI. Nerve block present   Pulses:   Pulses palpable and equal bilaterally   Skin:   No bleeding, bruising or rash   Neurologic:   Cranial nerves 2 - 12 grossly intact, sensation intact. Flexion and dorsiflexion intact bilateral feet.        I reviewed the patient's new clinical results.     Results for KAREN BAKER (MRN [id number]) as of 9/19/2019 11:08   Ref. Range 9/5/2019 11:39   Glucose Latest Ref Range: 65 - 99 mg/dL 78   Sodium Latest Ref Range: 136 - 145 mmol/L 141   Potassium Latest Ref Range: 3.5 - 5.2 mmol/L 4.4   CO2 Latest Ref Range: 22.0 - 29.0 mmol/L 27.0   Chloride Latest Ref Range: 98 - 107 mmol/L 101   Anion Gap Latest Ref Range: 5.0 - 15.0 mmol/L 13.0   Creatinine Latest Ref Range: 0.76 - 1.27 mg/dL 1.27   BUN Latest Ref Range: 8 - 23 mg/dL 27 (H)   BUN/Creatinine Ratio Latest Ref Range: 7.0 - 25.0  21.3   Calcium Latest Ref Range: 8.6 - 10.5 mg/dL 9.6   eGFR Non  Am Latest Ref Range: >60 mL/min/1.73 56 (L)   Hemoglobin A1C Latest Ref Range: 4.80 - 5.60 % 5.10   C-Reactive Protein Latest Ref Range: 0.00 - 0.50 mg/dL 0.39   Protime Latest Ref Range: 11.2 - 14.3 Seconds 13.8   INR Latest Ref Range: 0.85 - 1.16  1.11   PTT Latest Ref Range: 24.0 - 37.0 seconds 32.3   WBC Latest Ref Range: 3.40 - 10.80 10*3/mm3 6.43   RBC Latest Ref Range: 4.14 - 5.80 10*6/mm3 4.41   Hemoglobin Latest Ref Range: 13.0 - 17.7 g/dL 14.8   Hematocrit Latest Ref Range: 37.5 - 51.0 % 43.8   RDW Latest Ref Range: 12.3 - 15.4 % 13.0   MCV Latest Ref Range: 79.0 - 97.0 fL 99.3 (H)   MCH Latest Ref Range: 26.6 - 33.0 pg 33.6 (H)    MCHC Latest Ref Range: 31.5 - 35.7 g/dL 33.8   MPV Latest Ref Range: 6.0 - 12.0 fL 12.9 (H)   Platelets Latest Ref Range: 140 - 450 10*3/mm3 135 (L)     Assessment and Plan:     Status post total right knee replacement    Primary osteoarthritis of right knee    HTN (hypertension)    HLD (hyperlipidemia)    Hypothyroid      Plan  1. PT/OT- early ambulation postop  2. Pain control-prns, ( changed to Percocet with good results)wy  AC nerve block  3. IS-encourage  4. DVT proph- mechs/ASA  5. Bowel regimen  6. Resume home medications as appropriate  7. Monitor post-op labs  8. Discharge planning for home     HTN, HLD  - Continue home lisinopril and statin  - Hold HCTZ  - Monitor BP   - Holding parameters for BP meds  - Labetalol PRN for SBP>170    Hypothyroid  - Continue home synthroid      MARQUEZ Sanchez  09/19/19  12:17 PM   I have personally performed the evaluation on this patient. My history is consistent  with HPI obtained. My exam findings are listed above. I have personally reviewed and discussed the above formulated treatment plan with pt, RN and .APRN.wy      Electronically signed by Romel Weldon MD at 09/20/19 0856     José Luis Liao MD at 09/19/19 0652          [facility]-Op H&P  [facility]  [id number]  1950    Chief complaint: Right knee pain    HPI:  Patient is a 68 y.o.male who presents with primary osteoarthritis of the right knee.  He had no significant relief with the Visco supplementation injection series.  He is complaining of 8-10 out of 10 pain, worse with walking, standing, sitting and climbing stairs.  He also complains of pain at night, as well as with work and leisure activities.  No groin pain.  The pain is associated with swelling, popping, stiffness and giving way.  The pain is dull, aching and shooting.  He has reached the point where he would like to proceed with knee replacement surgery.  Of note, he had a successful outcome with a left total knee  replacement in .  No history of clots nor clotting disorders.  Last injection was 2019.     X-ray imaging was performed with findings that demonstrate mild worsening of arthritis, with medial joint space narrowing, near bone-on-bone contact medial compartment, with patellofemoral spurring consistent with degeneration of the knee.    She presents today for a right total knee arthroplasty    Review of Systems:  General ROS: negative for chills, fever or skin lesions;  No changes since last office visit  Cardiovascular ROS: no chest pain or dyspnea on exertion  Respiratory ROS: no cough, shortness of breath, or wheezing    Allergies: No Known Allergies    Home Meds:    No current facility-administered medications on file prior to encounter.      Current Outpatient Medications on File Prior to Encounter   Medication Sig Dispense Refill   • aspirin 81 MG EC tablet Take 81 mg by mouth Daily.     • lisinopril-hydrochlorothiazide (PRINZIDE,ZESTORETIC) 20-12.5 MG per tablet Take 1 tablet by mouth Daily.     • simvastatin (ZOCOR) 10 MG tablet Take 10 mg by mouth Every Night.     • SYNTHROID 125 MCG tablet Take 125 mcg by mouth Daily.     • [DISCONTINUED] Chlorhexidine Gluconate 4 % solution Shower with hibiclens solution as directed for 5 days prior to surgery 237 mL 0   • [DISCONTINUED] mupirocin (BACTROBAN) 2 % ointment Apply into the nostril(s) as directed by provider 2 (Two) Times a Day 5 days prior to surgery 22 g 0       PMH:   Past Medical History:   Diagnosis Date   • Arthritis    • Hypertension    • Hypothyroid    • Wears partial dentures     single lower tooth      PSH:    Past Surgical History:   Procedure Laterality Date   • CATARACT EXTRACTION Bilateral    • COLONOSCOPY     • KNEE ARTHROPLASTY Left        Social History:   Tobacco:   Social History     Tobacco Use   Smoking Status Former Smoker   • Last attempt to quit:    • Years since quittin.7   Smokeless Tobacco Former User   • Types:  "Chew   Tobacco Comment    occasional cigar  but stopped 30 yr ago quit chew tobacco 5 yr ago       Alcohol:     Social History     Substance and Sexual Activity   Alcohol Use Yes   • Alcohol/week: 1.2 oz   • Types: 2 Shots of liquor per week    Comment: occasional       Vitals:           /73 (BP Location: Right arm, Patient Position: Lying)   Pulse 71   Temp 97.8 °F (36.6 °C) (Tympanic)   Resp 18   Ht 180.3 cm (71\")   Wt 103 kg (226 lb)   SpO2 96%   BMI 31.52 kg/m²      Physical Exam:  General Appearance:    Alert, cooperative, no distress, appears stated age   Head:    Normocephalic, without obvious abnormality, atraumatic   Lungs:     Clear to auscultation bilaterally, respirations unlabored    Heart:   Regular rate and rhythm, S1 and S2 normal, no murmur, rub    or gallop    Abdomen:    Soft, non-tender.  +bowel sounds   Breast Exam:    deferred   Genitalia:    deferred   Extremities:   Extremities normal, atraumatic, no cyanosis or edema   Skin:   Skin color, texture, turgor normal, no rashes or lesions   Neurologic:   Grossly intact   Results Review  LABS:  Lab Results   Component Value Date    WBC 6.43 09/05/2019    HGB 14.8 09/05/2019    HCT 43.8 09/05/2019    MCV 99.3 (H) 09/05/2019     (L) 09/05/2019    NEUTROABS 4.29 09/05/2019    GLUCOSE 78 09/05/2019    BUN 27 (H) 09/05/2019    CREATININE 1.27 09/05/2019    EGFRIFNONA 56 (L) 09/05/2019     09/05/2019    K 4.4 09/05/2019     09/05/2019    CO2 27.0 09/05/2019    CALCIUM 9.6 09/05/2019       RADIOLOGY:  Imaging Results (last 72 hours)     ** No results found for the last 72 hours. **          I reviewed the patient's new clinical results.    Impression:   Primary osteoarthritis of the right knee    Plan:   Total knee arthroplasty, right    Lisa Rey PA-C   9/19/2019   6:52 AM     Agree with above.  Plan for right total knee arthroplasty.    José Luis Liao MD  09/19/19  7:25 AM      Electronically signed by Keshawn, " José Luis HAYNES MD at 19 0725          Physical Therapy Notes (most recent note)      Ian Xie, PT at 19 0957  Version 1 of 1         Acute Care - Physical Therapy Treatment Note/Discharge  GABBY Singh     Patient Name: Mir Stokes  : 1950  MRN: [id number]  Today's Date: 2019        Referring Physician: MD Keshawn    Admit Date: 2019    Visit Dx:    ICD-10-CM ICD-9-CM   1. Impaired mobility and ADLs Z74.09 799.89   2. Primary osteoarthritis of right knee M17.11 715.16     Patient Active Problem List   Diagnosis   • Primary osteoarthritis of right knee   • Status post total right knee replacement   • HTN (hypertension)   • HLD (hyperlipidemia)   • Hypothyroid       Physical Therapy Education     Title: PT OT SLP Therapies (Done)     Topic: Physical Therapy (Done)     Point: Mobility training (Done)     Learning Progress Summary           Patient Acceptance, E,D,H, VU by  at 2019  8:29 AM    Comment:  Reviewed HEP, gait mechanics, car transfer, stairs sequencing. Issued HEP handout    Acceptance, E,D, VU,NR by LR at 2019  1:16 PM    Comment:  Educated on precautions, weight bearing status, correct supine to sit t/f technique, correct sit<->stand t/f technique, correct gait mechanics, and progression of POC.   Significant Other Acceptance, E,D, VU,NR by LR at 2019  1:16 PM    Comment:  Educated on precautions, weight bearing status, correct supine to sit t/f technique, correct sit<->stand t/f technique, correct gait mechanics, and progression of POC.                   Point: Home exercise program (Done)     Learning Progress Summary           Patient Acceptance, E,D,H, VU by  at 2019  8:29 AM    Comment:  Reviewed HEP, gait mechanics, car transfer, stairs sequencing. Issued HEP handout    Acceptance, E,D, VU,NR by LR at 2019  1:16 PM    Comment:  Educated on precautions, weight bearing status, correct supine to sit t/f technique, correct sit<->stand  t/f technique, correct gait mechanics, and progression of POC.   Significant Other Acceptance, E,D, VU,NR by LR at 9/19/2019  1:16 PM    Comment:  Educated on precautions, weight bearing status, correct supine to sit t/f technique, correct sit<->stand t/f technique, correct gait mechanics, and progression of POC.                   Point: Body mechanics (Done)     Learning Progress Summary           Patient Acceptance, E,D,H, VU by  at 9/20/2019  8:29 AM    Comment:  Reviewed HEP, gait mechanics, car transfer, stairs sequencing. Issued HEP handout    Acceptance, E,D, VU,NR by LR at 9/19/2019  1:16 PM    Comment:  Educated on precautions, weight bearing status, correct supine to sit t/f technique, correct sit<->stand t/f technique, correct gait mechanics, and progression of POC.   Significant Other Acceptance, E,D, VU,NR by LR at 9/19/2019  1:16 PM    Comment:  Educated on precautions, weight bearing status, correct supine to sit t/f technique, correct sit<->stand t/f technique, correct gait mechanics, and progression of POC.                   Point: Precautions (Done)     Learning Progress Summary           Patient Acceptance, E,D,H, VU by MJ at 9/20/2019  8:29 AM    Comment:  Reviewed HEP, gait mechanics, car transfer, stairs sequencing. Issued HEP handout    Acceptance, E,D, VU,NR by LR at 9/19/2019  1:16 PM    Comment:  Educated on precautions, weight bearing status, correct supine to sit t/f technique, correct sit<->stand t/f technique, correct gait mechanics, and progression of POC.   Significant Other Acceptance, E,D, VU,NR by LR at 9/19/2019  1:16 PM    Comment:  Educated on precautions, weight bearing status, correct supine to sit t/f technique, correct sit<->stand t/f technique, correct gait mechanics, and progression of POC.                               User Key     Initials Effective Dates Name Provider Type Discipline     06/19/15 -  Renae Martinez, PT Physical Therapist PT     04/03/18 -   Ian Xie, PT Physical Therapist PT              Rehab Goal Summary     Row Name 09/20/19 0829             Bed Mobility Goal 1 (PT)    Activity/Assistive Device (Bed Mobility Goal 1, PT)  sit to supine/supine to sit  -MJ      [place] Level/Cues Needed (Bed Mobility Goal 1, PT)  conditional independence  -MJ      Time Frame (Bed Mobility Goal 1, PT)  long term goal (LTG);3 days  -MJ      Progress/Outcomes (Bed Mobility Goal 1, PT)  goal not met;discharged from facility  -MJ         Transfer Goal 1 (PT)    Activity/Assistive Device (Transfer Goal 1, PT)  sit-to-stand/stand-to-sit;walker, rolling  -MJ      [place] Level/Cues Needed (Transfer Goal 1, PT)  conditional independence  -MJ      Time Frame (Transfer Goal 1, PT)  long term goal (LTG);3 days  -MJ      Progress/Outcome (Transfer Goal 1, PT)  goal not met;discharged from facility  -MJ         Gait Training Goal 1 (PT)    Activity/Assistive Device (Gait Training Goal 1, PT)  gait (walking locomotion);walker, rolling  -MJ      [place] Level (Gait Training Goal 1, PT)  conditional independence  -MJ      Distance (Gait Goal 1, PT)  500 feet  -MJ      Time Frame (Gait Training Goal 1, PT)  long term goal (LTG);3 days  -MJ      Progress/Outcome (Gait Training Goal 1, PT)  goal partially met achieved distance  -MJ         ROM Goal 1 (PT)    ROM Goal 1 (PT)  0-90 degrees AAROM R knee  -MJ      Time Frame (ROM Goal 1, PT)  long term goal (LTG);3 days  -MJ      Progress/Outcome (ROM Goal 1, PT)  goal not met;discharged from facility  -MJ         Stairs Goal 1 (PT)    Activity/Assistive Device (Stairs Goal 1, PT)  ascending stairs;descending stairs;step-to-step;cane, straight HHA  -MJ      [place] Level/Cues Needed (Stairs Goal 1, PT)  contact guard assist  -MJ      Number of Stairs (Stairs Goal 1, PT)  2  -MJ      Time Frame (Stairs Goal 1, PT)  long term goal (LTG);3 days  -MJ      Progress/Outcome (Stairs Goal 1, PT)  goal met  -MJ        User Key   (r) = Recorded By, (t) = Taken By, (c) = Cosigned By    Initials Name Provider Type Discipline     Ian Xie, PT Physical Therapist PT        Therapy Treatment  Rehabilitation Treatment Summary     Row Name 09/20/19 0829             Treatment Time/Intention    Discipline  physical therapist  -MJ      Document Type  therapy note (daily note);discharge treatment  -MJ      Subjective Information  no complaints  -MJ      Mode of Treatment  physical therapy  -MJ      Patient/Family Observations  Pt in bathroom  -MJ      Care Plan Review  patient/other agree to care plan  -MJ      Patient Effort  excellent  -MJ      Existing Precautions/Restrictions  fall;other (see comments) R adductor nerve catheter  -MJ      Recorded by [MJ] Ian Xie, PT 09/20/19 0954      Row Name 09/20/19 0829             Cognitive Assessment/Intervention- PT/OT    Affect/Mental Status (Cognitive)  WNL  -MJ      Orientation Status (Cognition)  oriented x 4  -MJ      Follows Commands (Cognition)  WNL  -MJ      Recorded by [MJ] Ian Xie, PT 09/20/19 0954      Row Name 09/20/19 0829             Safety Issues, Functional Mobility    Impairments Affecting Function (Mobility)  pain;range of motion (ROM);strength  -MJ      Recorded by [MJ] Ian Xie, PT 09/20/19 0954      Row Name 09/20/19 0829             Mobility Assessment/Intervention    Right Lower Extremity (Weight-bearing Status)  weight-bearing as tolerated (WBAT)  -MJ      Recorded by [MJ] Ian Xie, PT 09/20/19 0954      Row Name 09/20/19 0829             Bed Mobility Assessment/Treatment    Comment (Bed Mobility)  NT- pt UIC  -MJ      Recorded by [] Ian Xie, PT 09/20/19 0954      Row Name 09/20/19 0829             Transfer Assessment/Treatment    Comment (Transfers)  Verbal cues for correct hand placement and to step R LE out prior to t/f.   -MJ      Recorded by [MJ] aIn Xie, PT 09/20/19 0954      Row Name 09/20/19 0829             Sit-Stand Transfer    Sit-Stand  [place] (Transfers)  not tested Pt standing upon arrival  -MJ      Recorded by [MJ] Ian Xie, PT 09/20/19 0954      Row Name 09/20/19 0829             Stand-Sit Transfer    Stand-Sit [place] (Transfers)  conditional independence;verbal cues  -MJ      Assistive Device (Stand-Sit Transfers)  walker, front-wheeled  -MJ      Recorded by [MJ] Ian Xie, PT 09/20/19 0954      Row Name 09/20/19 0829             Gait/Stairs Assessment/Training    64277 - Gait Training Minutes   13  -MJ      [place] Level (Gait)  supervision;verbal cues  -MJ      Assistive Device (Gait)  walker, front-wheeled  -MJ      Distance in Feet (Gait)  600  -MJ      Pattern (Gait)  step-through  -MJ      Deviations/Abnormal Patterns (Gait)  right sided deviations;antalgic;bilateral deviations;aracelis decreased;stride length decreased  -MJ      Bilateral Gait Deviations  weight shift ability decreased  -MJ      [place] Level (Stairs)  contact guard;verbal cues  -MJ      Assistive Device (Stairs)  cane, straight  -MJ      Handrail Location (Stairs)  other (see comments) x3 w/ 2 HR; x 2 w/ 1 HR and cane  -MJ      Number of Steps (Stairs)  5  -MJ      Ascending Technique (Stairs)  step-to-step  -MJ      Descending Technique (Stairs)  step-to-step  -MJ      Stairs, Safety Issues  sequencing ability decreased;weight-shifting ability decreased  -MJ      Stairs, Impairments  ROM decreased;strength decreased;impaired vision;pain  -MJ      Comment (Gait/Stairs)  Pt demo step through gait pattern. Cues for increased LE weight bearing, improvement noted. Pt performed stairs non-reciprocally. Cues to ascend with L LE and to descend with R LE. Cues for sequencing with cane  -MJ      Recorded by [MJ] Ian Xie, PT 09/20/19 0954      Row Name 09/20/19 0829             General ROM    RT Lower Ext  Rt Knee Extension/Flexion  -MJ      Recorded by [MJ] Ian Xie, PT 09/20/19 0954      Row Name 09/20/19 0829             Right Lower Ext     Rt Knee Extension/Flexion AROM  R knee 17-79 degrees; pt lacking 17 degrees from full extension; pt seated to measure AAROM flexion  -MJ      Recorded by [MJ] Ian Xie, PT 09/20/19 0954      Row Name 09/20/19 0829             Therapeutic Exercise    80895 - PT Therapeutic Exercise Minutes  11  -MJ      Recorded by [MJ] Ian Xie, PT 09/20/19 0954      Row Name 09/20/19 0829             Therapeutic Exercise    Lower Extremity (Therapeutic Exercise)  heel slides, right;LAQ (long arc quad), right;quad sets, right;SAQ (short arc quad), right;SLR (straight leg raise), right  -MJ      Lower Extremity Range of Motion (Therapeutic Exercise)  ankle dorsiflexion/plantar flexion, right  -MJ      Exercise Type (Therapeutic Exercise)  AROM (active range of motion)  -MJ      Position (Therapeutic Exercise)  seated  -MJ      Sets/Reps (Therapeutic Exercise)  15x each  -MJ      Comment (Therapeutic Exercise)  Cues for technique. No physical assist required  -MJ      Recorded by [MJ] Ian Xie, PT 09/20/19 0954      Row Name 09/20/19 0829             Positioning and Restraints    Pre-Treatment Position  in bed  -MJ      Post Treatment Position  chair  -MJ      In Chair  notified nsg;reclined;call light within reach;encouraged to call for assist;exit alarm on  -MJ      Recorded by [MJ] Ian Xie, PT 09/20/19 0954      Row Name 09/20/19 0829             Pain Scale: Numbers Pre/Post-Treatment    Pain Scale: Numbers, Pretreatment  2/10  -MJ      Pain Scale: Numbers, Post-Treatment  2/10  -MJ      Pain Location - Side  Right  -MJ      Pain Location - Orientation  generalized  -MJ      Pain Location  knee  -MJ      Pain Intervention(s)  Repositioned;Ambulation/increased activity;Cold pack  -MJ      Recorded by [MJ] Ian Xie, PT 09/20/19 0954      Row Name                Wound 09/19/19 0840 Right knee Incision    Wound - Properties Group Date first assessed: 09/19/19 [JL] Time first assessed: 0840 [JL] Side: Right [JL]  Location: knee [JL] Primary Wound Type: Incision [JL] Recorded by:  [LANDY] Mata, Savanna, RN 09/19/19 0840    Row Name 09/20/19 0829             Plan of Care Review    Plan of Care Reviewed With  patient  -MJ      Recorded by [MJ] Ian Xie, PT 09/20/19 0954      Row Name 09/20/19 0829             Outcome Summary/Treatment Plan (PT)    Daily Summary of Progress (PT)  progress toward functional goals is good  -MJ      Recorded by [ROXANE] Ian Xie, PT 09/20/19 0954        User Key  (r) = Recorded By, (t) = Taken By, (c) = Cosigned By    Initials Name Effective Dates Discipline    MJ Ian Xie, PT 04/03/18 -  PT    Savanna Barbosa RN 06/17/19 -  Nurse        Wound 09/19/19 0840 Right knee Incision (Active)   Dressing Appearance no drainage;intact;dry 9/20/2019  8:25 AM   Closure SILVESTRE 9/19/2019  8:25 PM   Base dressing in place, unable to visualize 9/19/2019  8:25 PM   Dressing Care, Wound elastic bandage 9/19/2019  8:25 PM       PT Recommendation and Plan     Outcome Summary/Treatment Plan (PT)  Daily Summary of Progress (PT): progress toward functional goals is good  Plan of Care Reviewed With: patient  Progress: improving  Outcome Summary: Pt increased gait distance to 600 feet with supervision and RW. Pt progressed to stair training and ROM measured 17-79 degrees. Pt is discharging home today with HHPT, made good progress toward goals during inpatient stay    Outcome Measures     Row Name 09/20/19 0829 09/20/19 0712          How much help from another person do you currently need...    Turning from your back to your side while in flat bed without using bedrails?  4  -MJ  --     Moving from lying on back to sitting on the side of a flat bed without bedrails?  3  -MJ  --     Moving to and from a bed to a chair (including a wheelchair)?  3  -MJ  --     Standing up from a chair using your arms (e.g., wheelchair, bedside chair)?  3  -MJ  --     Climbing 3-5 steps with a railing?  3  -MJ  --     To walk in  hospital room?  3  -MJ  --     AM-PAC 6 Clicks Score (PT)  19  -MJ  --        How much help from another is currently needed...    Putting on and taking off regular lower body clothing?  --  3  -ST     Bathing (including washing, rinsing, and drying)  --  3  -ST     Toileting (which includes using toilet bed pan or urinal)  --  4  -ST     Putting on and taking off regular upper body clothing  --  4  -ST     Taking care of personal grooming (such as brushing teeth)  --  4  -ST     Eating meals  --  4  -ST     AM-PAC 6 Clicks Score (OT)  --  22  -ST        Functional Assessment    Outcome Measure Options  AM-PAC 6 Clicks Basic Mobility (PT)  -MJ  AM-PAC 6 Clicks Daily Activity (OT)  -ST       User Key  (r) = Recorded By, (t) = Taken By, (c) = Cosigned By    Initials Name Provider Type     Ale Funez, OTR Occupational Therapist    Ian Aleman, PT Physical Therapist           Time Calculation:   PT Charges     Row Name 09/20/19 0829             Time Calculation    Start Time  0829  -MJ      PT Received On  09/20/19  -      PT Goal Re-Cert Due Date  09/29/19  -         Time Calculation- PT    Total Timed Code Minutes- PT  24 minute(s)  -         Timed Charges    77255 - PT Therapeutic Exercise Minutes  11  -MJ      87767 - Gait Training Minutes   13  -MJ        User Key  (r) = Recorded By, (t) = Taken By, (c) = Cosigned By    Initials Name Provider Type    Ian Aleman, PT Physical Therapist        Therapy Charges for Today     Code Description Service Date Service Provider Modifiers Qty    56153339862  PT THER PROC EA 15 MIN 9/20/2019 Ian Xie, PT GP 1    96420148168 HC GAIT TRAINING EA 15 MIN 9/20/2019 Ian Xie, PT GP 1          PT G-Codes  Outcome Measure Options: AM-PAC 6 Clicks Basic Mobility (PT)  AM-PAC 6 Clicks Score (PT): 19  AM-PAC 6 Clicks Score (OT): 22    PT Discharge Summary  Reason for Discharge: Discharge from facility  Outcomes Achieved: Patient able to partially acheive  established goals  Discharge Destination: Home with assist, Home with home health    Ian Xie, RONAL  9/20/2019         Electronically signed by Ian Xie, PT at 09/20/19 0957

## 2019-09-20 NOTE — THERAPY DISCHARGE NOTE
Acute Care - Occupational Therapy Initial Eval/Discharge  [facility]     Patient Name: Mir Stokes  : 1950  MRN: [id number]  Today's Date: 2019     Date of Referral to OT: 19  Referring Physician: MD Keshawn      Admit Date: 2019       ICD-10-CM ICD-9-CM   1. Impaired mobility and ADLs Z74.09 799.89   2. Primary osteoarthritis of right knee M17.11 715.16     Patient Active Problem List   Diagnosis   • Primary osteoarthritis of right knee   • Status post total right knee replacement   • HTN (hypertension)   • HLD (hyperlipidemia)   • Hypothyroid     Past Medical History:   Diagnosis Date   • Arthritis    • Hypertension    • Hypothyroid    • Wears partial dentures     single lower tooth      Past Surgical History:   Procedure Laterality Date   • CATARACT EXTRACTION Bilateral    • COLONOSCOPY     • KNEE ARTHROPLASTY Left    • TOTAL KNEE ARTHROPLASTY Right 2019    Procedure: TOTAL RIGHT KNEE ARTHROPLASTY;  Surgeon: José Luis Liao MD;  [facility];  Service: Orthopedics          OT ASSESSMENT FLOWSHEET (last 12 hours)      Occupational Therapy Evaluation     Row Name 19 0712                   OT Evaluation Time/Intention    Subjective Information  no complaints  -ST        Document Type  evaluation;discharge treatment;therapy note (daily note)  -ST        Mode of Treatment  individual therapy;occupational therapy  -ST        Patient Effort  excellent  -ST        Symptoms Noted During/After Treatment  none  -ST        Comment  pre-medicated  -ST           General Information    Patient Profile Reviewed?  yes  -ST        Referring Physician  MD Keshawn  -ST        Patient Observations  alert;cooperative;agree to therapy  -ST        Patient/Family Observations  wife not present  -ST        General Observations of Patient  Pt sitting UIB with nerve cath intact  -ST        Prior Level of Function  independent:;all household mobility;transfer;bed  mobility;feeding;grooming;min assist:;dressing;bathing;home management;cooking;cleaning leisure, work and ADL tasks becoming very painful  -ST        Equipment Currently Used at Home  walker, rolling;raised toilet not using PTA  -ST        Pertinent History of Current Functional Problem  Pt presents for sx management of long-standing R knee pain and dysfunction that failed to improve with conservative measures and impacted pt's ability to perform ADLs and mobility. Pt now POD 1 and s/p R TKA. Pt underwent L TKA in 2014.   -ST        Existing Precautions/Restrictions  other (see comments) AC nerve cath  -ST        Equipment Issued to Patient  leg ;reacher;sock aid  -ST        Risks Reviewed  patient:;LOB;nausea/vomiting;dizziness;increased discomfort;change in vital signs  -ST        Benefits Reviewed  patient:;improve function;increase independence;increase strength;increase balance;decrease pain;increase knowledge  -ST        Barriers to Rehab  previous functional deficit  -ST           Relationship/Environment    Primary Source of Support/Comfort  spouse  -ST        Family Caregiver if Needed  spouse  -ST        Primary Roles/Responsibilities  wage earner, full time  -ST        Concerns About Impact on Relationships  pt farms and owns/rides/trains barrel horses   -ST           Resource/Environmental Concerns    Current Living Arrangements  home/apartment/condo  -ST           Home Main Entrance    Number of Stairs, Main Entrance  three  -ST        Stairs Comment, Main Entrance  will stay on 1st level of home  -ST           Cognitive Assessment/Interventions    Additional Documentation  Cognitive Assessment/Intervention (Group)  -ST           Cognitive Assessment/Intervention- PT/OT    Affect/Mental Status (Cognitive)  WNL  -ST        Orientation Status (Cognition)  oriented x 4  -ST        Follows Commands (Cognition)  WNL  -ST        Cognitive Function (Cognitive)  WNL  -ST           Safety Issues,  Functional Mobility    Impairments Affecting Function (Mobility)  pain;range of motion (ROM)  -ST           Mobility Assessment/Treatment    Extremity Weight-bearing Status  right lower extremity  -ST        Right Lower Extremity (Weight-bearing Status)  weight-bearing as tolerated (WBAT)  -ST           Bed Mobility Assessment/Treatment    Supine-Sit [place] (Bed Mobility)  conditional independence  -ST        Bed Mobility, Safety Issues  decreased use of legs for bridging/pushing  -ST        Assistive Device (Bed Mobility)  head of bed elevated;leg   -ST        Comment (Bed Mobility)  issued leg  to improve comfort and independence   -ST           Functional Mobility    Functional Mobility- Ind. Level  supervision required  -        Functional Mobility- Device  rolling walker  -ST        Functional Mobility-Distance (Feet)  10  -ST        Functional Mobility- Comment  good safety and balance   -ST           Transfer Assessment/Treatment    Transfer Assessment/Treatment  sit-stand transfer;stand-sit transfer  -ST        Comment (Transfers)  good teach back with hand placement and extension of sx LE for safety and comfort   -ST           Sit-Stand Transfer    Sit-Stand [place] (Transfers)  conditional independence  -ST        Assistive Device (Sit-Stand Transfers)  walker, front-wheeled  -ST           Stand-Sit Transfer    Stand-Sit Coy (Transfers)  conditional independence  -ST        Assistive Device (Stand-Sit Transfers)  walker, front-wheeled  -ST           ADL Assessment/Intervention    00979 - OT Self Care/Mgmt Minutes  26  -ST        BADL Assessment/Intervention  bathing;upper body dressing;lower body dressing;feeding;grooming  -ST           Bathing Assessment/Intervention    Bathing [place] Level  lower body;distal lower extremities/feet;set up  -ST        Bathing Position  edge of bed sitting  -ST        Comment (Bathing)  demo's use of AE appropriately; d/t pain,  benefits from AE use   -ST           Upper Body Dressing Assessment/Training    Upper Body Dressing [place] Level  doff;don;pull-over garment;independent  -ST        Upper Body Dressing Position  edge of bed sitting  -ST           Lower Body Dressing Assessment/Training    Lower Body Dressing [place] Level  doff;don;socks;pants/bottoms;shoes/slippers;set up  -ST        Assistive Devices (Lower Body Dressing)  sock-aid;long-handled shoe horn;reacher  -ST        Lower Body Dressing Position  edge of bed sitting  -ST        Comment (Lower Body Dressing)  appropriate teach-back with AE use; use required on sx LE d/t pain and dec. flexibility   -ST           Grooming Assessment/Training    [place] Level (Grooming)  other (see comments)  -ST        Comment (Grooming)  applied deoderant EOB  -ST           Self-Feeding Assessment/Training    [place] Level (Feeding)  feeding skills;independent  -ST        Position (Self-Feeding)  sitting up in bed  -ST           BADL Safety/Performance    Impairments, BADL Safety/Performance  pain;range of motion  -ST        Skilled BADL Treatment/Intervention  adaptive equipment training;compensatory training  -ST        Progress in BADL Status  improvement noted;independence level  -ST           General ROM    GENERAL ROM COMMENTS  [facility]'s WFL   -ST           MMT (Manual Muscle Testing)    General MMT Comments  [facility]'s WFL   -ST           Motor Assessment/Interventions    Additional Documentation  Balance (Group)  -ST           Balance    Balance  dynamic sitting balance;dynamic standing balance  -ST           Dynamic Sitting Balance    Level of [place], Reaches Outside Midline (Sitting, Dynamic Balance)  independent  -ST        Sitting Position, Reaches Outside Midline (Sitting, Dynamic Balance)  sitting on edge of bed  -ST           Dynamic Standing Balance    Level of [place], Reaches Outside Midline (Standing, Dynamic Balance)  supervision  -ST        Time  Able to Maintain Position, Reaches Outside Midline (Standing, Dynamic Balance)  45 to 60 seconds  -ST        Assistive Device Utilized (Supported Standing, Dynamic Balance)  walker, rolling  -ST           Sensory Assessment/Intervention    Sensory General Assessment  no sensation deficits identified  -ST           Positioning and Restraints    Pre-Treatment Position  in bed  -ST        Post Treatment Position  chair  -ST        In Chair  reclined;call light within reach;encouraged to call for assist;exit alarm on;with nsg nsg giving meds  -ST           Pain Scale: Numbers Pre/Post-Treatment    Pain Scale: Numbers, Pretreatment  2/10  -ST        Pain Scale: Numbers, Post-Treatment  3/10  -ST        Pain Location - Side  Right  -ST        Pain Location - Orientation  posterior  -ST        Pain Location  knee  -ST        Pain Intervention(s)  Medication (See MAR);Repositioned;Ambulation/increased activity  -ST           Wound 09/19/19 0840 Right knee Incision    Wound - Properties Group Date first assessed: 09/19/19  -JL Time first assessed: 0840  -JL Side: Right  -JL Location: knee  -JL Primary Wound Type: Incision  -JL       Clinical Impression (OT)    Date of Referral to OT  09/20/19  -ST        OT Diagnosis  impaired ADLs  -ST        Prognosis (OT Eval)  good  -ST        Patient/Family Goals Statement (OT Eval)  return home   -ST        Therapy Frequency (OT Eval)  evaluation only  -ST        Care Plan Review (OT)  evaluation/treatment results reviewed;care plan/treatment goals reviewed;risks/benefits reviewed;current/potential barriers reviewed;patient/other agree to care plan  -ST        Anticipated Discharge Disposition (OT)  home with home health;home with 24/7 care;home with OP services  -ST           Discharge Summary (Occupational Therapy)    Additional Documentation  Discharge Summary, OT Eval (Group)  -ST           Discharge Summary, OT Eval    Reason for Discharge (OT Discharge Summary)  patient  discharged from this facility  -        Outcomes Achieved Upon Discharge (OT Discharge Summary)  discharge from facility occurred on same date as evaluation  -           Living Environment    Home Accessibility  stairs to enter home walk-in shower  -          User Key  (r) = Recorded By, (t) = Taken By, (c) = Cosigned By    Initials Name Effective Dates     Ale Funez OTR 06/10/18 -     Savanna Barbosa RN 06/17/19 -           Occupational Therapy Education     Title: PT OT SLP Therapies (Done)     Topic: Occupational Therapy (Done)     Point: ADL training (Done)     Description: Instruct learner(s) on proper safety adaptation and remediation techniques during self care or transfers.   Instruct in proper use of assistive devices.    Learning Progress Summary           Patient Acceptance, E,TB,D, VU,DU by  at 9/20/2019  7:12 AM    Comment:  see flow sheet                   Point: Home exercise program (Done)     Description: Instruct learner(s) on appropriate technique for monitoring, assisting and/or progressing therapeutic exercises/activities.    Learning Progress Summary           Patient Acceptance, E,TB,D, VU,DU by  at 9/20/2019  7:12 AM    Comment:  see flow sheet                   Point: Precautions (Done)     Description: Instruct learner(s) on prescribed precautions during self-care and functional transfers.    Learning Progress Summary           Patient Acceptance, E,TB,D, VU,DU by  at 9/20/2019  7:12 AM    Comment:  see flow sheet                   Point: Body mechanics (Done)     Description: Instruct learner(s) on proper positioning and spine alignment during self-care, functional mobility activities and/or exercises.    Learning Progress Summary           Patient Acceptance, E,TB,D, VU,DU by  at 9/20/2019  7:12 AM    Comment:  see flow sheet                               User Key     Initials Effective Dates Name Provider Type Discipline     06/10/18 -  Ale Funez  OTR Occupational Therapist OT                OT Recommendation and Plan  Outcome Summary/Treatment Plan (OT)  Anticipated Discharge Disposition (OT): home with home health, home with 24/7 care, home with OP services  Reason for Discharge (OT Discharge Summary): patient discharged from this facility  Therapy Frequency (OT Eval): evaluation only  Plan of Care Review  Plan of Care Reviewed With: patient  Plan of Care Reviewed With: patient  Outcome Summary: Pt s/p TKA and now with improved pain and symtpoms. Pt demonstrates good overall safety and understanding or transfer technique, rwx safety, AE use and ADL retraining/compensatory strategies to improve independence and comfort. Plan for d/c home with adequate home support and all needed DME and AE.         Outcome Measures     Row Name 09/20/19 0712             How much help from another is currently needed...    Putting on and taking off regular lower body clothing?  3  -ST      Bathing (including washing, rinsing, and drying)  3  -ST      Toileting (which includes using toilet bed pan or urinal)  4  -ST      Putting on and taking off regular upper body clothing  4  -ST      Taking care of personal grooming (such as brushing teeth)  4  -ST      Eating meals  4  -ST      AM-PAC 6 Clicks Score (OT)  22  -ST         Functional Assessment    Outcome Measure Options  AM-PAC 6 Clicks Daily Activity (OT)  -ST        User Key  (r) = Recorded By, (t) = Taken By, (c) = Cosigned By    Initials Name Provider Type    Ale Serrano OTR Occupational Therapist          Time Calculation:   Time Calculation- OT     Row Name 09/20/19 0712             Time Calculation- OT    OT Start Time  0712  -ST      OT Received On  09/20/19  -ST         Timed Charges    24500 - OT Self Care/Mgmt Minutes  26  -ST        User Key  (r) = Recorded By, (t) = Taken By, (c) = Cosigned By    Initials Name Provider Type    Ale Serrano OTR Occupational Therapist        Therapy Suggested  Charges     Code   Minutes Charges    23274 (CPT®) Hc Ot Neuromusc Re Education Ea 15 Min      65098 (CPT®) Hc Ot Ther Proc Ea 15 Min      79107 (CPT®) Hc Ot Therapeutic Act Ea 15 Min      93682 (CPT®) Hc Ot Manual Therapy Ea 15 Min      37481 (CPT®) Hc Ot Iontophoresis Ea 15 Min      34957 (CPT®) Hc Ot Elec Stim Ea-Per 15 Min      61344 (CPT®) Hc Ot Ultrasound Ea 15 Min      36796 (CPT®) Hc Ot Self Care/Mgmt/Train Ea 15 Min 26 2    Total  26 2        Therapy Charges for Today     Code Description Service Date Service Provider Modifiers Qty    75327390558 HC OT SELF CARE/MGMT/TRAIN EA 15 MIN 9/20/2019 Ale Funez, OTR GO 2    [id number] HC OT EVAL LOW COMPLEXITY 2 9/20/2019 Ale Funez OTLELA GO 1               OT Discharge Summary  Anticipated Discharge Disposition (OT): home with home health, home with 24/7 care, home with OP services  Reason for Discharge: Discharge from facility  Outcomes Achieved: Discharge from facility occurred on same date as evluation  Discharge Destination: Home with assist, Home with home health, Home with outpatient services    ANITA Boothe  9/20/2019

## 2019-09-20 NOTE — THERAPY DISCHARGE NOTE
Acute Care - Physical Therapy Treatment Note/Discharge   [place]     Patient Name: Mir Stokes  : 1950  MRN: [id number]  Today's Date: 2019        Referring Physician: MD Keshawn    Admit Date: 2019    Visit Dx:    ICD-10-CM ICD-9-CM   1. Impaired mobility and ADLs Z74.09 799.89   2. Primary osteoarthritis of right knee M17.11 715.16     Patient Active Problem List   Diagnosis   • Primary osteoarthritis of right knee   • Status post total right knee replacement   • HTN (hypertension)   • HLD (hyperlipidemia)   • Hypothyroid       Physical Therapy Education     Title: PT OT SLP Therapies (Done)     Topic: Physical Therapy (Done)     Point: Mobility training (Done)     Learning Progress Summary           Patient Acceptance, E,D,H, VU by  at 2019  8:29 AM    Comment:  Reviewed HEP, gait mechanics, car transfer, stairs sequencing. Issued HEP handout    Acceptance, E,D, VU,NR by LR at 2019  1:16 PM    Comment:  Educated on precautions, weight bearing status, correct supine to sit t/f technique, correct sit<->stand t/f technique, correct gait mechanics, and progression of POC.   Significant Other Acceptance, E,D, VU,NR by LR at 2019  1:16 PM    Comment:  Educated on precautions, weight bearing status, correct supine to sit t/f technique, correct sit<->stand t/f technique, correct gait mechanics, and progression of POC.                   Point: Home exercise program (Done)     Learning Progress Summary           Patient Acceptance, E,D,H, VU by ROXANE at 2019  8:29 AM    Comment:  Reviewed HEP, gait mechanics, car transfer, stairs sequencing. Issued HEP handout    Acceptance, E,D, VU,NR by LR at 2019  1:16 PM    Comment:  Educated on precautions, weight bearing status, correct supine to sit t/f technique, correct sit<->stand t/f technique, correct gait mechanics, and progression of POC.   Significant Other Acceptance, E,D, VU,NR by LR at 2019  1:16 PM    Comment:   Educated on precautions, weight bearing status, correct supine to sit t/f technique, correct sit<->stand t/f technique, correct gait mechanics, and progression of POC.                   Point: Body mechanics (Done)     Learning Progress Summary           Patient Acceptance, E,D,H, VU by  at 9/20/2019  8:29 AM    Comment:  Reviewed HEP, gait mechanics, car transfer, stairs sequencing. Issued HEP handout    Acceptance, E,D, VU,NR by LR at 9/19/2019  1:16 PM    Comment:  Educated on precautions, weight bearing status, correct supine to sit t/f technique, correct sit<->stand t/f technique, correct gait mechanics, and progression of POC.   Significant Other Acceptance, E,D, VU,NR by LR at 9/19/2019  1:16 PM    Comment:  Educated on precautions, weight bearing status, correct supine to sit t/f technique, correct sit<->stand t/f technique, correct gait mechanics, and progression of POC.                   Point: Precautions (Done)     Learning Progress Summary           Patient Acceptance, E,D,H, VU by  at 9/20/2019  8:29 AM    Comment:  Reviewed HEP, gait mechanics, car transfer, stairs sequencing. Issued HEP handout    Acceptance, E,D, VU,NR by LR at 9/19/2019  1:16 PM    Comment:  Educated on precautions, weight bearing status, correct supine to sit t/f technique, correct sit<->stand t/f technique, correct gait mechanics, and progression of POC.   Significant Other Acceptance, E,D, VU,NR by LR at 9/19/2019  1:16 PM    Comment:  Educated on precautions, weight bearing status, correct supine to sit t/f technique, correct sit<->stand t/f technique, correct gait mechanics, and progression of POC.                               User Key     Initials Effective Dates Name Provider Type Discipline     06/19/15 -  Renae Martinez, PT Physical Therapist PT     04/03/18 -  Ian Xie, PT Physical Therapist PT              Rehab Goal Summary     Row Name 09/20/19 0829             Bed Mobility Goal 1 (PT)     Activity/Assistive Device (Bed Mobility Goal 1, PT)  sit to supine/supine to sit  -MJ      [place] Level/Cues Needed (Bed Mobility Goal 1, PT)  conditional independence  -MJ      Time Frame (Bed Mobility Goal 1, PT)  long term goal (LTG);3 days  -MJ      Progress/Outcomes (Bed Mobility Goal 1, PT)  goal not met;discharged from facility  -MJ         Transfer Goal 1 (PT)    Activity/Assistive Device (Transfer Goal 1, PT)  sit-to-stand/stand-to-sit;walker, rolling  -MJ      [place] Level/Cues Needed (Transfer Goal 1, PT)  conditional independence  -MJ      Time Frame (Transfer Goal 1, PT)  long term goal (LTG);3 days  -MJ      Progress/Outcome (Transfer Goal 1, PT)  goal not met;discharged from facility  -MJ         Gait Training Goal 1 (PT)    Activity/Assistive Device (Gait Training Goal 1, PT)  gait (walking locomotion);walker, rolling  -MJ      [place] Level (Gait Training Goal 1, PT)  conditional independence  -MJ      Distance (Gait Goal 1, PT)  500 feet  -MJ      Time Frame (Gait Training Goal 1, PT)  long term goal (LTG);3 days  -MJ      Progress/Outcome (Gait Training Goal 1, PT)  goal partially met achieved distance  -MJ         ROM Goal 1 (PT)    ROM Goal 1 (PT)  0-90 degrees AAROM R knee  -MJ      Time Frame (ROM Goal 1, PT)  long term goal (LTG);3 days  -MJ      Progress/Outcome (ROM Goal 1, PT)  goal not met;discharged from facility  -MJ         Stairs Goal 1 (PT)    Activity/Assistive Device (Stairs Goal 1, PT)  ascending stairs;descending stairs;step-to-step;cane, straight HHA  -MJ      [place] Level/Cues Needed (Stairs Goal 1, PT)  contact guard assist  -MJ      Number of Stairs (Stairs Goal 1, PT)  2  -MJ      Time Frame (Stairs Goal 1, PT)  long term goal (LTG);3 days  -MJ      Progress/Outcome (Stairs Goal 1, PT)  goal met  -MJ        User Key  (r) = Recorded By, (t) = Taken By, (c) = Cosigned By    Initials Name Provider Type Discipline    Ian Aleman, PT Physical Therapist  PT        Therapy Treatment  Rehabilitation Treatment Summary     Row Name 09/20/19 0829             Treatment Time/Intention    Discipline  physical therapist  -MJ      Document Type  therapy note (daily note);discharge treatment  -MJ      Subjective Information  no complaints  -MJ      Mode of Treatment  physical therapy  -MJ      Patient/Family Observations  Pt in bathroom  -MJ      Care Plan Review  patient/other agree to care plan  -MJ      Patient Effort  excellent  -MJ      Existing Precautions/Restrictions  fall;other (see comments) R adductor nerve catheter  -MJ      Recorded by [MJ] Ian Xei, PT 09/20/19 0954      Row Name 09/20/19 0829             Cognitive Assessment/Intervention- PT/OT    Affect/Mental Status (Cognitive)  WNL  -MJ      Orientation Status (Cognition)  oriented x 4  -MJ      Follows Commands (Cognition)  WNL  -MJ      Recorded by [MJ] Ian Xie, PT 09/20/19 0954      Row Name 09/20/19 0829             Safety Issues, Functional Mobility    Impairments Affecting Function (Mobility)  pain;range of motion (ROM);strength  -MJ      Recorded by [MJ] Ian Xie, PT 09/20/19 0954      Row Name 09/20/19 0829             Mobility Assessment/Intervention    Right Lower Extremity (Weight-bearing Status)  weight-bearing as tolerated (WBAT)  -MJ      Recorded by [MJ] Ian Xie, PT 09/20/19 0954      Row Name 09/20/19 0829             Bed Mobility Assessment/Treatment    Comment (Bed Mobility)  NT- pt UIC  -MJ      Recorded by [MJ] Ian Xie, PT 09/20/19 0954      Row Name 09/20/19 0829             Transfer Assessment/Treatment    Comment (Transfers)  Verbal cues for correct hand placement and to step R LE out prior to t/f.   -MJ      Recorded by [MJ] Ian Xie, PT 09/20/19 0954      Row Name 09/20/19 0829             Sit-Stand Transfer    Sit-Stand [place] (Transfers)  not tested Pt standing upon arrival  -MJ      Recorded by [MJ] Ian Xie, PT 09/20/19 0954      Row Name  09/20/19 0829             Stand-Sit Transfer    Stand-Sit [place] (Transfers)  conditional independence;verbal cues  -MJ      Assistive Device (Stand-Sit Transfers)  walker, front-wheeled  -MJ      Recorded by [MJ] Ian Xie, PT 09/20/19 0954      Row Name 09/20/19 0829             Gait/Stairs Assessment/Training    68289 - Gait Training Minutes   13  -MJ      [place] Level (Gait)  supervision;verbal cues  -MJ      Assistive Device (Gait)  walker, front-wheeled  -MJ      Distance in Feet (Gait)  600  -MJ      Pattern (Gait)  step-through  -MJ      Deviations/Abnormal Patterns (Gait)  right sided deviations;antalgic;bilateral deviations;aracelis decreased;stride length decreased  -MJ      Bilateral Gait Deviations  weight shift ability decreased  -MJ      [place] Level (Stairs)  contact guard;verbal cues  -MJ      Assistive Device (Stairs)  cane, straight  -MJ      Handrail Location (Stairs)  other (see comments) x3 w/ 2 HR; x 2 w/ 1 HR and cane  -MJ      Number of Steps (Stairs)  5  -MJ      Ascending Technique (Stairs)  step-to-step  -MJ      Descending Technique (Stairs)  step-to-step  -MJ      Stairs, Safety Issues  sequencing ability decreased;weight-shifting ability decreased  -MJ      Stairs, Impairments  ROM decreased;strength decreased;impaired vision;pain  -MJ      Comment (Gait/Stairs)  Pt demo step through gait pattern. Cues for increased LE weight bearing, improvement noted. Pt performed stairs non-reciprocally. Cues to ascend with L LE and to descend with R LE. Cues for sequencing with cane  -MJ      Recorded by [MJ] Ian Xie, PT 09/20/19 0954      Row Name 09/20/19 0829             General ROM    RT Lower Ext  Rt Knee Extension/Flexion  -MJ      Recorded by [MJ] Ian Xie, PT 09/20/19 0954      Row Name 09/20/19 0829             Right Lower Ext    Rt Knee Extension/Flexion AROM  R knee 17-79 degrees; pt lacking 17 degrees from full extension; pt seated to measure AAROM flexion   -MJ      Recorded by [MJ] Ian Xie, PT 09/20/19 0954      Row Name 09/20/19 0829             Therapeutic Exercise    01688 - PT Therapeutic Exercise Minutes  11  -MJ      Recorded by [MJ] Ian Xie, PT 09/20/19 0954      Row Name 09/20/19 0829             Therapeutic Exercise    Lower Extremity (Therapeutic Exercise)  heel slides, right;LAQ (long arc quad), right;quad sets, right;SAQ (short arc quad), right;SLR (straight leg raise), right  -MJ      Lower Extremity Range of Motion (Therapeutic Exercise)  ankle dorsiflexion/plantar flexion, right  -MJ      Exercise Type (Therapeutic Exercise)  AROM (active range of motion)  -MJ      Position (Therapeutic Exercise)  seated  -MJ      Sets/Reps (Therapeutic Exercise)  15x each  -MJ      Comment (Therapeutic Exercise)  Cues for technique. No physical assist required  -MJ      Recorded by [MJ] Ian Xie, PT 09/20/19 0954      Row Name 09/20/19 0829             Positioning and Restraints    Pre-Treatment Position  in bed  -MJ      Post Treatment Position  chair  -MJ      In Chair  notified nsg;reclined;call light within reach;encouraged to call for assist;exit alarm on  -MJ      Recorded by [MJ] Ian Xie, PT 09/20/19 0954      Row Name 09/20/19 0829             Pain Scale: Numbers Pre/Post-Treatment    Pain Scale: Numbers, Pretreatment  2/10  -MJ      Pain Scale: Numbers, Post-Treatment  2/10  -MJ      Pain Location - Side  Right  -MJ      Pain Location - Orientation  generalized  -MJ      Pain Location  knee  -MJ      Pain Intervention(s)  Repositioned;Ambulation/increased activity;Cold pack  -MJ      Recorded by [MJ] Ian Xie, PT 09/20/19 0954      Row Name                Wound 09/19/19 0840 Right knee Incision    Wound - Properties Group Date first assessed: 09/19/19 [JL] Time first assessed: 0840 [JL] Side: Right [JL] Location: knee [JL] Primary Wound Type: Incision [JL] Recorded by:  [JL] Mata, [place]anna, RN 09/19/19 0840    Row Name 09/20/19 0829              Plan of Care Review    Plan of Care Reviewed With  patient  -MJ      Recorded by [MJ] CrystalIan mercer, PT 09/20/19 0954      Row Name 09/20/19 0829             Outcome Summary/Treatment Plan (PT)    Daily Summary of Progress (PT)  progress toward functional goals is good  -MJ      Recorded by [MJ] Ian Xie, PT 09/20/19 0954        User Key  (r) = Recorded By, (t) = Taken By, (c) = Cosigned By    Initials Name Effective Dates Discipline     Crystalruslan Ian, PT 04/03/18 -  PT    Savanna Barbosa, RN 06/17/19 -  Nurse        Wound 09/19/19 0840 Right knee Incision (Active)   Dressing Appearance no drainage;intact;dry 9/20/2019  8:25 AM   Closure SILVESTRE 9/19/2019  8:25 PM   Base dressing in place, unable to visualize 9/19/2019  8:25 PM   Dressing Care, Wound elastic bandage 9/19/2019  8:25 PM       PT Recommendation and Plan     Outcome Summary/Treatment Plan (PT)  Daily Summary of Progress (PT): progress toward functional goals is good  Plan of Care Reviewed With: patient  Progress: improving  Outcome Summary: Pt increased gait distance to 600 feet with supervision and RW. Pt progressed to stair training and ROM measured 17-79 degrees. Pt is discharging home today with HHPT, made good progress toward goals during inpatient stay    Outcome Measures     Row Name 09/20/19 0829 09/20/19 0712          How much help from another person do you currently need...    Turning from your back to your side while in flat bed without using bedrails?  4  -MJ  --     Moving from lying on back to sitting on the side of a flat bed without bedrails?  3  -MJ  --     Moving to and from a bed to a chair (including a wheelchair)?  3  -MJ  --     Standing up from a chair using your arms (e.g., wheelchair, bedside chair)?  3  -MJ  --     Climbing 3-5 steps with a railing?  3  -MJ  --     To walk in hospital room?  3  -MJ  --     AM-PAC 6 Clicks Score (PT)  19  -MJ  --        How much help from another is currently needed...    Putting  on and taking off regular lower body clothing?  --  3  -ST     Bathing (including washing, rinsing, and drying)  --  3  -ST     Toileting (which includes using toilet bed pan or urinal)  --  4  -ST     Putting on and taking off regular upper body clothing  --  4  -ST     Taking care of personal grooming (such as brushing teeth)  --  4  -ST     Eating meals  --  4  -ST     AM-PAC 6 Clicks Score (OT)  --  22  -ST        Functional Assessment    Outcome Measure Options  AM-PAC 6 Clicks Basic Mobility (PT)  -MJ  AM-PAC 6 Clicks Daily Activity (OT)  -ST       User Key  (r) = Recorded By, (t) = Taken By, (c) = Cosigned By    Initials Name Provider Type    ST Ale Funez, OTR Occupational Therapist    Ian Aleman, PT Physical Therapist           Time Calculation:   PT Charges     Row Name 09/20/19 0829             Time Calculation    Start Time  0829  -MJ      PT Received On  09/20/19  -      PT Goal Re-Cert Due Date  09/29/19  -         Time Calculation- PT    Total Timed Code Minutes- PT  24 minute(s)  -         Timed Charges    39622 - PT Therapeutic Exercise Minutes  11  -MJ      65609 - Gait Training Minutes   13  -MJ        User Key  (r) = Recorded By, (t) = Taken By, (c) = Cosigned By    Initials Name Provider Type    Ian Aleman, PT Physical Therapist        Therapy Charges for Today     Code Description Service Date Service Provider Modifiers Qty    99503751470 HC PT THER PROC EA 15 MIN 9/20/2019 Ian Xie, PT GP 1    68865038754 HC GAIT TRAINING EA 15 MIN 9/20/2019 Ian Xie, PT GP 1          PT G-Codes  Outcome Measure Options: AM-PAC 6 Clicks Basic Mobility (PT)  AM-PAC 6 Clicks Score (PT): 19  AM-PAC 6 Clicks Score (OT): 22    PT Discharge Summary  Reason for Discharge: Discharge from facility  Outcomes Achieved: Patient able to partially acheive established goals  Discharge Destination: Home with assist, Home with home health    Ian Xie PT  9/20/2019

## 2019-09-20 NOTE — PROGRESS NOTES
Discharge Planning Assessment  [facility]     Patient Name: Mir Stokes  MRN: [id number]  Today's Date: 9/20/2019    Admit Date: 9/19/2019    Discharge Needs Assessment     Row Name 09/20/19 1043       Living Environment    Lives With  spouse    Current Living Arrangements  home/apartment/condo    Primary Care Provided by  self    Provides Primary Care For  no one    Family Caregiver if Needed  spouse    Able to Return to Prior Arrangements  yes       Transition Planning    Patient/Family Anticipates Transition to  home    Transportation Anticipated  family or friend will provide       Discharge Needs Assessment    Equipment Currently Used at Home  walker, rolling        Discharge Plan     Row Name 09/20/19 1044       Plan    Plan  Home w/ HH    Patient/Family in Agreement with Plan  yes    Plan Comments  Spoke w/ patient at bedside. He lives with his wife in a two story, bed and bath on first floor, in [place] PTA he was independent with ADL's and mobility. He has a RW for use at dc. Per therapy recs patient would benefit from HH/PT. Patient agreeable and would like a referral to [facility], he has used them in the past. Referral called to Analy, she is aware of patient's pending dc today. No other needs noted. CM following.     Final Discharge Disposition Code  06 - home with home health care        Destination      No service coordination in this encounter.      Durable Medical Equipment      No service coordination in this encounter.      Dialysis/Infusion      No service coordination in this encounter.      Home Medical Care - Selection Complete      Service Provider Request Status Selected Services Address Phone Number Fax Number    [facility] Selected [facility] Health Services [address] [place] [address] [phone]      Therapy      No service coordination in this encounter.      Community Resources      No service coordination in this  encounter.          Demographic Summary     Row Name 09/20/19 1043       General Information    Arrived From  home    Reason for Consult  discharge planning        Functional Status     Row Name 09/20/19 1043       Functional Status    Usual Activity Tolerance  good    Current Activity Tolerance  moderate        Psychosocial    No documentation.       Abuse/Neglect    No documentation.       Legal    No documentation.       Substance Abuse    No documentation.       Patient Forms    No documentation.           Joana Lee RN

## 2019-09-20 NOTE — PROGRESS NOTES
"      [facility] Orthopaedic Surgery Progress Note    Subjective      LOS: 0 days   Patient Care Team:  Ye Soto MD as PCP - General (Family Medicine)    Chief complaint: Right knee pain       Interval History:   Patient sitting comfortably in a chair.  Pain is controlled.  He would like to go home today.    Objective      Vital Signs  Temp (24hrs), Av.9 °F (37.2 °C), Min:97 °F (36.1 °C), Max:101.1 °F (38.4 °C)      /70 (BP Location: Left arm, Patient Position: Lying)   Pulse 87   Temp 98.6 °F (37 °C) (Oral)   Resp 16   Ht 180.3 cm (71\")   Wt 103 kg (226 lb)   SpO2 93%   BMI 31.52 kg/m²     Examination:   Examination of the right knee: The wound is clean, dry, and intact.  Ankle dorsiflexion, ankle plantar flexion, and EHL are intact.  Sensation intact in the foot to light touch.  2+ dorsalis pedis pulse.  Straight leg raise is intact.      Labs:  Results from last 7 days   Lab Units 19  0733   WBC 10*3/mm3 9.57   HEMOGLOBIN g/dL 10.8*   HEMATOCRIT % 32.5*   MCV fL 101.6*   PLATELETS 10*3/mm3 94*       PT:  Pt increased gait distance to 600 feet with supervision and RW. Pt progressed to stair training and ROM measured 17-79 degrees. Pt is discharging home today with HHPT, made good progress toward goals during inpatient stay     Results Review:     I reviewed the patient's new clinical results.    Assessment and Plan     Assessment:   Status post right total knee arthroplasty-doing well      Status post total right knee replacement    Primary osteoarthritis of right knee    HTN (hypertension)    HLD (hyperlipidemia)    Hypothyroid      Plan for disposition: Plan for discharge home today.  Follow-up with me in 3 weeks as planned.     Provider Department Center   10/9/2019 11:10 AM José Luis Liao MD [facility] ORTHOPEDIC SPORTS MEDICINE    Appt Notes: 3 WEEK POSTOP           José Luis Liao MD  19  12:23 PM    "

## 2019-09-20 NOTE — PLAN OF CARE
Problem: Patient Care Overview  Goal: Plan of Care Review   09/20/19 2441   Plan of Care Review   Progress improving   OTHER   Outcome Summary Pt ambulated 350ft SBA x1 gait belt and walker, decreased sensation to LLE, arrow at 10ml/hr.Voiding without difficulty. Pt c/o prn norco was ineffective, Dr MIRANDA on unit, ok to switch to percocet, percocet effective. Ice packs present. PT working with pt, CM to see. PT recommending HHPT. Dr Liao and Y following, labs in AM pending. Temperature 101.1 this AM, decreased to 99 with increased incentive spirometer use and PRN tylenol.  Presumbed d/c 9-20   Coping/Psychosocial   Plan of Care Reviewed With patient

## 2019-09-21 NOTE — PROGRESS NOTES
GABBY Singh    Nerve Cath Post Op Call    Patient Name: Mir Stokes  :  1950  MRN:  [id number]  Date of Discharge: 2019    Nerve Cath Post Op Call:    Analgesia:Good  Pain Score:10  Side Effects:None  Catheter Site:clean  Patient Controlled ON Q pump infusion rate: 10ml/hr  Catheter Plan:Will continue with plan at home without changes and The patient was instructed to call ON CALL Anesthesia provider for any questions or problems

## 2019-09-22 NOTE — PROGRESS NOTES
GABBY Singh    Nerve Cath Post Op Call    Patient Name: Mir Stokes  :  1950  MRN:  [id number]  Date of Discharge: 2019    Nerve Cath Post Op Call:    Catheter Plan:Patient/Family member report nerve catheter previously discontinued, tip intact

## 2019-10-04 ENCOUNTER — OFFICE VISIT (OUTPATIENT)
Dept: ORTHOPEDIC SURGERY | Facility: CLINIC | Age: 69
End: 2019-10-04

## 2019-10-04 ENCOUNTER — TELEPHONE (OUTPATIENT)
Dept: ORTHOPEDIC SURGERY | Facility: CLINIC | Age: 69
End: 2019-10-04

## 2019-10-04 DIAGNOSIS — Z47.89 ORTHOPEDIC AFTERCARE: ICD-10-CM

## 2019-10-04 DIAGNOSIS — Z96.651 STATUS POST TOTAL KNEE REPLACEMENT, RIGHT: Primary | ICD-10-CM

## 2019-10-04 PROCEDURE — 99024 POSTOP FOLLOW-UP VISIT: CPT | Performed by: ORTHOPAEDIC SURGERY

## 2019-10-04 RX ORDER — OXYCODONE HYDROCHLORIDE AND ACETAMINOPHEN 5; 325 MG/1; MG/1
1 TABLET ORAL EVERY 8 HOURS PRN
Qty: 20 TABLET | Refills: 0 | Status: SHIPPED | OUTPATIENT
Start: 2019-10-04 | End: 2019-11-13

## 2019-10-04 NOTE — PROGRESS NOTES
[facility] [place] [place]    Subjective     Chief Complaint   Patient presents with   • Post-op     2 week S/P - TOTAL RIGHT KNEE ARTHROPLASTY (2019)         HPI    Mir Stokes is a 69 y.o. male.  He follows up today for his right total knee arthroplasty.  He is doing well, making improvements.  Ambulating without external aids.      Patient Active Problem List   Diagnosis   • Primary osteoarthritis of right knee   • Status post total right knee replacement   • HTN (hypertension)   • HLD (hyperlipidemia)   • Hypothyroid   • Acute blood loss anemia, mild, asymptomatic   • Acute postoperative pain   • Thrombocytopenia (CMS/HCC)     Past Medical History:   Diagnosis Date   • Arthritis    • Hypertension    • Hypothyroid    • Wears partial dentures     single lower tooth       Past Surgical History:   Procedure Laterality Date   • CATARACT EXTRACTION Bilateral    • COLONOSCOPY     • KNEE ARTHROPLASTY Left    • TOTAL KNEE ARTHROPLASTY Right 2019    Procedure: TOTAL RIGHT KNEE ARTHROPLASTY;  Surgeon: José Luis Liao MD;  Location: [facility];  Service: Orthopedics      Family History   Problem Relation Age of Onset   • Hypertension Mother    • Heart attack Mother    • Hypertension Father    • Heart attack Father      Social History     Socioeconomic History   • Marital status:      Spouse name: Not on file   • Number of children: Not on file   • Years of education: Not on file   • Highest education level: Not on file   Tobacco Use   • Smoking status: Former Smoker     Last attempt to quit: 1985     Years since quittin.7   • Smokeless tobacco: Former User     Types: Chew   • Tobacco comment: occasional cigar  but stopped 30 yr ago quit chew tobacco 5 yr ago    Substance and Sexual Activity   • Alcohol use: Yes     Alcohol/week: 1.2 oz     Types: 2 Shots of liquor per week     Comment: occasional   • Drug use: No   • Sexual activity: Defer      Current Outpatient  Medications on File Prior to Visit   Medication Sig Dispense Refill   • aspirin 81 MG EC tablet Take 1 tablet by mouth Daily. Resume in 1 month     • aspirin  MG EC tablet Take 1 tablet by mouth Daily. For 1 month 30 tablet 0   • CBD (cannabidiol) oral oil Take 1 drop by mouth Daily.     • docusate sodium (COLACE) 100 MG capsule Take 1 capsule by mouth 2 (Two) Times a Day As Needed for Constipation. 60 capsule 0   • ferrous sulfate 324 (65 Fe) MG tablet delayed-release EC tablet Take 324 mg by mouth Daily With Breakfast.     • lisinopril-hydrochlorothiazide (PRINZIDE,ZESTORETIC) 20-12.5 MG per tablet Take 1 tablet by mouth Daily.     • meloxicam (MOBIC) 15 MG tablet Take 1 tablet by mouth Daily. Must take an hour after aspirin if needed.     • simvastatin (ZOCOR) 10 MG tablet Take 10 mg by mouth Every Night.     • SYNTHROID 125 MCG tablet Take 125 mcg by mouth Daily.       No current facility-administered medications on file prior to visit.       No Known Allergies     Review of Systems   Constitutional: Negative.    HENT: Negative.    Eyes: Negative.    Respiratory: Negative.    Cardiovascular: Negative.    Gastrointestinal: Negative.    Endocrine: Negative.    Genitourinary: Negative.    Musculoskeletal: Positive for arthralgias.   Skin: Negative.    Allergic/Immunologic: Negative.    Neurological: Negative.    Hematological: Negative.    Psychiatric/Behavioral: Negative.         Objective      Physical Exam  There were no vitals taken for this visit.    There is no height or weight on file to calculate BMI.    General:   Mental Status:  Alert   Appearance: Cooperative, in no acute distress   Build and Nutrition: Well-nourished well-developed male   Orientation: Alert and oriented to person, place and time   Posture: Normal   Gait: Mild limp on the right    Integument:   Right knee: Wound is well-healed with no signs of infection    Lower Extremities:   Right Knee:    Tenderness:  None    Effusion:   1+    Swelling: None    Crepitus:  None    Range of motion:  Extension: 5°       Flexion: 110°  Instability:  No varus laxity, no valgus laxity, negative anterior drawer  Deformities:  None      Imaging/Studies      Imaging Results (last 24 hours)     Procedure Component Value Units Date/Time    XR Knee 3+ View With Antelope Right [[id number]] Resulted:  10/04/19 1328     Updated:  10/04/19 1328    Narrative:       Right Knee Radiographs  Indication: status-post right total knee arthroplasty  Views: AP, lateral, and sunrise views of the right knee    Comparison: no change compared to prior study, 9/19/2019    Findings:   The components are well aligned, with no signs of loosening or failure.          Assessment and Plan     Mir was seen today for post-op.    Diagnoses and all orders for this visit:    Status post total knee replacement, right  -     XR Knee 3+ View With Antelope Right    Orthopedic aftercare    Other orders  -     oxyCODONE-acetaminophen (PERCOCET) 5-325 MG per tablet; Take 1 tablet by mouth Every 8 (Eight) Hours As Needed for Moderate Pain .        1. Status post total knee replacement, right    2. Orthopedic aftercare        I reviewed my findings with the patient today.  His right total knee arthroplasty is functioning well, and he is pleased with results.  He will continue with rehabilitation, and I will see him back in 6 weeks, no x-rays are required.  I will see him back sooner for any problems.    I did provide a refill of the Percocet, which I anticipate will be his last prescription, as he has been weaning off of the Percocet.    Return in about 6 weeks (around 11/15/2019).      Medical Decision Making  Management Options : physical/occupational therapy  Data/Risk: radiology tests and independent visualization of imaging, lab tests, or EMG/NCV      José Luis Liao MD  10/04/19  1:37 PM

## 2019-10-17 ENCOUNTER — TELEPHONE (OUTPATIENT)
Dept: ORTHOPEDIC SURGERY | Facility: CLINIC | Age: 69
End: 2019-10-17

## 2019-10-17 DIAGNOSIS — Z96.651 STATUS POST TOTAL KNEE REPLACEMENT, RIGHT: Primary | ICD-10-CM

## 2019-10-17 NOTE — TELEPHONE ENCOUNTER
HOME HEALTH NURSE CALLED AND IS REQUESTING AN ORDER FOR OUTPATIENT PHYSICAL THERAPY BE ENTERED IN FOR THIS PATIENT. THIS ORDER CAN BE FAXED TO THE FACILITY WHENEVER IT IS ENTERED.     [facility] [phone]

## 2019-11-13 ENCOUNTER — OFFICE VISIT (OUTPATIENT)
Dept: ORTHOPEDIC SURGERY | Facility: CLINIC | Age: 69
End: 2019-11-13

## 2019-11-13 DIAGNOSIS — Z47.89 ORTHOPEDIC AFTERCARE: ICD-10-CM

## 2019-11-13 DIAGNOSIS — Z96.651 STATUS POST TOTAL KNEE REPLACEMENT, RIGHT: Primary | ICD-10-CM

## 2019-11-13 PROCEDURE — 99024 POSTOP FOLLOW-UP VISIT: CPT | Performed by: ORTHOPAEDIC SURGERY

## 2019-11-13 NOTE — PROGRESS NOTES
[facility] [place] [facility] Note    Subjective     Chief Complaint   Patient presents with   • Post-op     6 week f/u; 8 weeks status post Right Total Knee Arthroplasty 2019        HPI    Mir Stokes is a 69 y.o. male.  He follows up today for his right total knee arthroplasty.  He is doing well today, with 100% relief compared to his preoperative symptoms.  Fully ambulatory without external aids.      Patient Active Problem List   Diagnosis   • Primary osteoarthritis of right knee   • Status post total right knee replacement   • HTN (hypertension)   • HLD (hyperlipidemia)   • Hypothyroid   • Acute blood loss anemia, mild, asymptomatic   • Acute postoperative pain   • Thrombocytopenia (CMS/HCC)     Past Medical History:   Diagnosis Date   • Arthritis    • Hypertension    • Hypothyroid    • Wears partial dentures     single lower tooth       Past Surgical History:   Procedure Laterality Date   • CATARACT EXTRACTION Bilateral    • COLONOSCOPY     • KNEE ARTHROPLASTY Left    • TOTAL KNEE ARTHROPLASTY Right 2019    Procedure: TOTAL RIGHT KNEE ARTHROPLASTY;  Surgeon: José Luis Liao MD;  Location: [facility];  Service: Orthopedics      Family History   Problem Relation Age of Onset   • Hypertension Mother    • Heart attack Mother    • Hypertension Father    • Heart attack Father      Social History     Socioeconomic History   • Marital status:      Spouse name: Not on file   • Number of children: Not on file   • Years of education: Not on file   • Highest education level: Not on file   Tobacco Use   • Smoking status: Former Smoker     Last attempt to quit: 1985     Years since quittin.8   • Smokeless tobacco: Former User     Types: Chew   • Tobacco comment: occasional cigar  but stopped 30 yr ago quit chew tobacco 5 yr ago    Substance and Sexual Activity   • Alcohol use: Yes     Alcohol/week: 1.2 oz     Types: 2 Shots of liquor per week     Comment: occasional   • Drug  use: No   • Sexual activity: Defer      Current Outpatient Medications on File Prior to Visit   Medication Sig Dispense Refill   • aspirin 81 MG EC tablet Take 1 tablet by mouth Daily. Resume in 1 month     • aspirin  MG EC tablet Take 1 tablet by mouth Daily. For 1 month 30 tablet 0   • CBD (cannabidiol) oral oil Take 1 drop by mouth Daily.     • docusate sodium (COLACE) 100 MG capsule Take 1 capsule by mouth 2 (Two) Times a Day As Needed for Constipation. 60 capsule 0   • ferrous sulfate 324 (65 Fe) MG tablet delayed-release EC tablet Take 324 mg by mouth Daily With Breakfast.     • lisinopril-hydrochlorothiazide (PRINZIDE,ZESTORETIC) 20-12.5 MG per tablet Take 1 tablet by mouth Daily.     • simvastatin (ZOCOR) 10 MG tablet Take 10 mg by mouth Every Night.     • SYNTHROID 125 MCG tablet Take 125 mcg by mouth Daily.     • [DISCONTINUED] meloxicam (MOBIC) 15 MG tablet Take 1 tablet by mouth Daily. Must take an hour after aspirin if needed.     • [DISCONTINUED] oxyCODONE-acetaminophen (PERCOCET) 5-325 MG per tablet Take 1 tablet by mouth Every 8 (Eight) Hours As Needed for Moderate Pain . 20 tablet 0     No current facility-administered medications on file prior to visit.       No Known Allergies     Review of Systems   Constitutional: Negative for activity change, appetite change, chills, diaphoresis, fatigue, fever and unexpected weight change.   HENT: Negative for congestion, dental problem, drooling, ear discharge, ear pain, facial swelling, hearing loss, mouth sores, nosebleeds, postnasal drip, rhinorrhea, sinus pressure, sneezing, sore throat, tinnitus, trouble swallowing and voice change.    Eyes: Negative for photophobia, pain, discharge, redness, itching and visual disturbance.   Respiratory: Negative for apnea, cough, choking, chest tightness, shortness of breath, wheezing and stridor.    Cardiovascular: Negative for chest pain, palpitations and leg swelling.   Gastrointestinal: Negative for abdominal  distention, abdominal pain, anal bleeding, blood in stool, constipation, diarrhea, nausea, rectal pain and vomiting.   Endocrine: Negative for cold intolerance, heat intolerance, polydipsia, polyphagia and polyuria.   Genitourinary: Negative for decreased urine volume, difficulty urinating, dysuria, enuresis, flank pain, frequency, genital sores, hematuria and urgency.   Musculoskeletal: Positive for arthralgias. Negative for back pain, gait problem, joint swelling, myalgias, neck pain and neck stiffness.   Skin: Negative for color change, pallor, rash and wound.   Allergic/Immunologic: Negative for environmental allergies, food allergies and immunocompromised state.   Neurological: Negative for dizziness, tremors, seizures, syncope, facial asymmetry, speech difficulty, weakness, light-headedness, numbness and headaches.   Hematological: Negative for adenopathy. Does not bruise/bleed easily.   Psychiatric/Behavioral: Negative for agitation, behavioral problems, confusion, decreased concentration, dysphoric mood, hallucinations, self-injury, sleep disturbance and suicidal ideas. The patient is not nervous/anxious and is not hyperactive.         Objective      Physical Exam  There were no vitals taken for this visit.    There is no height or weight on file to calculate BMI.    General:   Mental Status:  Alert   Appearance: Cooperative, in no acute distress   Build and Nutrition: Well-nourished well-developed male   Orientation: Alert and oriented to person, place and time   Posture: Normal   Gait: Normal    Integument:   Right knee: Wound is well-healed with no signs of infection    Lower Extremities:   Right Knee:    Tenderness:  None    Effusion:  None    Swelling: None    Crepitus:  None    Range of motion:  Extension: 0°       Flexion: 130°  Instability:  No varus laxity, no valgus laxity, negative anterior drawer  Deformities:  None      Assessment and Plan     Mir was seen today for post-op.    Diagnoses and  all orders for this visit:    Status post total knee replacement, right    Orthopedic aftercare        1. Status post total knee replacement, right    2. Orthopedic aftercare        I reviewed my findings with the patient today.  His right total knee arthroplasty is functioning well, and he is pleased with results.  I will see him back in 10 months, which would be a one-year checkup, but sooner for any problems.    Return in about 10 months (around 9/13/2020) for Recheck with X-Rays.          José Luis Liao MD  11/13/19  4:52 PM

## 2021-09-02 NOTE — PROGRESS NOTES
[facility] – [place] [facility] Note    Subjective     Chief Complaint   Patient presents with   • Injections     Right knee orthovisc #3        HPI    Mir Stokes III is a 68 y.o. male who presents for the third and final Orthovisc injection into the right knee.  Of note, he has seen slight improvement.    Patient Active Problem List   Diagnosis   • Primary osteoarthritis of right knee     Past Medical History:   Diagnosis Date   • Hypertension       Past Surgical History:   Procedure Laterality Date   • CATARACT EXTRACTION  2017   • JOINT REPLACEMENT Left 2014    TKA       Family History   Problem Relation Age of Onset   • Hypertension Mother    • Heart attack Mother    • Hypertension Father    • Heart attack Father      Social History     Socioeconomic History   • Marital status:      Spouse name: Not on file   • Number of children: Not on file   • Years of education: Not on file   • Highest education level: Not on file   Tobacco Use   • Smoking status: Former Smoker   • Smokeless tobacco: Current User     Types: Chew   Substance and Sexual Activity   • Alcohol use: Yes     Comment: occasional   • Drug use: No   • Sexual activity: Defer      Current Outpatient Medications on File Prior to Visit   Medication Sig Dispense Refill   • lisinopril-hydrochlorothiazide (PRINZIDE,ZESTORETIC) 20-12.5 MG per tablet      • meloxicam (MOBIC) 15 MG tablet      • simvastatin (ZOCOR) 10 MG tablet      • SYNTHROID 125 MCG tablet        No current facility-administered medications on file prior to visit.       No Known Allergies     Review of Systems     Objective      Physical Exam  There were no vitals taken for this visit.    There is no height or weight on file to calculate BMI.    General:   Mental Status:  Alert   Appearance: Cooperative, in no acute distress   Posture: Normal    Assessment and Plan     Mir was seen today for injections.    Diagnoses and all orders for this visit:    Primary osteoarthritis  of right knee  -     Large Joint Arthrocentesis: R knee        1. Primary osteoarthritis of right knee        Administration of viscosupplementation today.  Please see my procedure note for details.    Return in about 2 months (around 7/8/2019).    José Luis Liao MD  05/08/19  2:35 PM   11

## (undated) DEVICE — UNDERCAST PADDING: Brand: DEROYAL

## (undated) DEVICE — WEBRIL* CAST PADDING: Brand: DEROYAL

## (undated) DEVICE — CEMENT MIXING SYSTEM WITH MIS FEMORAL BREAKAWAY NOZZLE: Brand: REVOLUTION

## (undated) DEVICE — STRYKER PERFORMANCE SERIES SAGITTAL BLADE: Brand: STRYKER PERFORMANCE SERIES

## (undated) DEVICE — PUMP PAIN AUTOFUSER AUTO SELCT NOBOLUS 1TO14ML/HR 550ML DISP

## (undated) DEVICE — DRSNG PAD ABD 8X10IN STRL

## (undated) DEVICE — CVR HNDL LT SURG ACCSSRY BLU STRL

## (undated) DEVICE — PAD,ABDOMINAL,8"X10",ST,LF: Brand: MEDLINE

## (undated) DEVICE — BNDG ELAS W/CLIP 6IN 10YD LF STRL

## (undated) DEVICE — ANTIBACTERIAL UNDYED BRAIDED (POLYGLACTIN 910), SYNTHETIC ABSORBABLE SUTURE: Brand: COATED VICRYL

## (undated) DEVICE — Device

## (undated) DEVICE — NDL HYPO ECLPS SFTY 18G 1 1/2IN

## (undated) DEVICE — T4 PULLOVER TOGA, X-LARGE

## (undated) DEVICE — BANDAGE,GAUZE,BULKEE II,4.5"X4.1YD,STRL: Brand: MEDLINE

## (undated) DEVICE — PK KN TOTL 10

## (undated) DEVICE — SKIN AFFIX SURG ADHESIVE 72/CS 0.55ML: Brand: MEDLINE

## (undated) DEVICE — GLV SURG SENSICARE W/ALOE PF LF 9 STRL

## (undated) DEVICE — SYS SKIN CLS DERMABOND PRINEO W/22CM MESH TP

## (undated) DEVICE — GLV SURG PREMIERPRO MIC LTX PF SZ8.5 BRN